# Patient Record
Sex: MALE | Race: WHITE | NOT HISPANIC OR LATINO | URBAN - METROPOLITAN AREA
[De-identification: names, ages, dates, MRNs, and addresses within clinical notes are randomized per-mention and may not be internally consistent; named-entity substitution may affect disease eponyms.]

---

## 2017-11-29 ENCOUNTER — INPATIENT (INPATIENT)
Facility: HOSPITAL | Age: 47
LOS: 4 days | Discharge: HOME | DRG: 872 | End: 2017-12-04
Attending: SPECIALIST | Admitting: SPECIALIST
Payer: COMMERCIAL

## 2017-11-29 VITALS
RESPIRATION RATE: 16 BRPM | SYSTOLIC BLOOD PRESSURE: 130 MMHG | OXYGEN SATURATION: 100 % | HEART RATE: 96 BPM | DIASTOLIC BLOOD PRESSURE: 74 MMHG | WEIGHT: 199.96 LBS | TEMPERATURE: 98 F

## 2017-11-29 DIAGNOSIS — N17.9 ACUTE KIDNEY FAILURE, UNSPECIFIED: ICD-10-CM

## 2017-11-29 DIAGNOSIS — S41.119A LACERATION WITHOUT FOREIGN BODY OF UNSPECIFIED UPPER ARM, INITIAL ENCOUNTER: Chronic | ICD-10-CM

## 2017-11-29 DIAGNOSIS — K65.1 PERITONEAL ABSCESS: ICD-10-CM

## 2017-11-29 DIAGNOSIS — Z98.890 OTHER SPECIFIED POSTPROCEDURAL STATES: Chronic | ICD-10-CM

## 2017-11-29 DIAGNOSIS — I47.1 SUPRAVENTRICULAR TACHYCARDIA: ICD-10-CM

## 2017-11-29 DIAGNOSIS — J98.11 ATELECTASIS: ICD-10-CM

## 2017-11-29 DIAGNOSIS — F19.10 OTHER PSYCHOACTIVE SUBSTANCE ABUSE, UNCOMPLICATED: ICD-10-CM

## 2017-11-29 DIAGNOSIS — K57.30 DIVERTICULOSIS OF LARGE INTESTINE WITHOUT PERFORATION OR ABSCESS WITHOUT BLEEDING: ICD-10-CM

## 2017-11-29 DIAGNOSIS — F17.200 NICOTINE DEPENDENCE, UNSPECIFIED, UNCOMPLICATED: ICD-10-CM

## 2017-11-29 LAB
ALBUMIN SERPL ELPH-MCNC: 2.3 G/DL — LOW (ref 3.3–5)
ALP SERPL-CCNC: 82 U/L — SIGNIFICANT CHANGE UP (ref 40–120)
ALT FLD-CCNC: 99 U/L — HIGH (ref 12–78)
AMYLASE P1 CFR SERPL: 20 U/L — LOW (ref 25–115)
ANION GAP SERPL CALC-SCNC: 6 MMOL/L — SIGNIFICANT CHANGE UP (ref 5–17)
ANION GAP SERPL CALC-SCNC: 6 MMOL/L — SIGNIFICANT CHANGE UP (ref 5–17)
APPEARANCE UR: CLEAR — SIGNIFICANT CHANGE UP
AST SERPL-CCNC: 48 U/L — HIGH (ref 15–37)
BASOPHILS # BLD AUTO: 0.2 K/UL — SIGNIFICANT CHANGE UP (ref 0–0.2)
BASOPHILS NFR BLD AUTO: 0.9 % — SIGNIFICANT CHANGE UP (ref 0–2)
BILIRUB SERPL-MCNC: 0.2 MG/DL — SIGNIFICANT CHANGE UP (ref 0.2–1.2)
BILIRUB UR-MCNC: NEGATIVE — SIGNIFICANT CHANGE UP
BUN SERPL-MCNC: 10 MG/DL — SIGNIFICANT CHANGE UP (ref 7–23)
BUN SERPL-MCNC: 8 MG/DL — SIGNIFICANT CHANGE UP (ref 7–23)
CALCIUM SERPL-MCNC: 7.3 MG/DL — LOW (ref 8.5–10.1)
CALCIUM SERPL-MCNC: 8.6 MG/DL — SIGNIFICANT CHANGE UP (ref 8.5–10.1)
CHLORIDE SERPL-SCNC: 104 MMOL/L — SIGNIFICANT CHANGE UP (ref 96–108)
CHLORIDE SERPL-SCNC: 107 MMOL/L — SIGNIFICANT CHANGE UP (ref 96–108)
CO2 SERPL-SCNC: 28 MMOL/L — SIGNIFICANT CHANGE UP (ref 22–31)
CO2 SERPL-SCNC: 31 MMOL/L — SIGNIFICANT CHANGE UP (ref 22–31)
COLOR SPEC: YELLOW — SIGNIFICANT CHANGE UP
CREAT SERPL-MCNC: 1.2 MG/DL — SIGNIFICANT CHANGE UP (ref 0.5–1.3)
CREAT SERPL-MCNC: 1.9 MG/DL — HIGH (ref 0.5–1.3)
DIFF PNL FLD: ABNORMAL
EOSINOPHIL # BLD AUTO: 0.2 K/UL — SIGNIFICANT CHANGE UP (ref 0–0.5)
EOSINOPHIL NFR BLD AUTO: 1.2 % — SIGNIFICANT CHANGE UP (ref 0–6)
GLUCOSE SERPL-MCNC: 84 MG/DL — SIGNIFICANT CHANGE UP (ref 70–99)
GLUCOSE SERPL-MCNC: 91 MG/DL — SIGNIFICANT CHANGE UP (ref 70–99)
GLUCOSE UR QL: NEGATIVE — SIGNIFICANT CHANGE UP
GRAM STN FLD: SIGNIFICANT CHANGE UP
HCT VFR BLD CALC: 35.1 % — LOW (ref 39–50)
HCT VFR BLD CALC: 40 % — SIGNIFICANT CHANGE UP (ref 39–50)
HGB BLD-MCNC: 11.2 G/DL — LOW (ref 13–17)
HGB BLD-MCNC: 13 G/DL — SIGNIFICANT CHANGE UP (ref 13–17)
KETONES UR-MCNC: NEGATIVE — SIGNIFICANT CHANGE UP
LACTATE SERPL-SCNC: 1.1 MMOL/L — SIGNIFICANT CHANGE UP (ref 0.7–2)
LACTATE SERPL-SCNC: 2.4 MMOL/L — HIGH (ref 0.7–2)
LEUKOCYTE ESTERASE UR-ACNC: NEGATIVE — SIGNIFICANT CHANGE UP
LIDOCAIN IGE QN: 104 U/L — SIGNIFICANT CHANGE UP (ref 73–393)
LYMPHOCYTES # BLD AUTO: 1.6 K/UL — SIGNIFICANT CHANGE UP (ref 1–3.3)
LYMPHOCYTES # BLD AUTO: 9.5 % — LOW (ref 13–44)
MCHC RBC-ENTMCNC: 28.4 PG — SIGNIFICANT CHANGE UP (ref 27–34)
MCHC RBC-ENTMCNC: 29.1 PG — SIGNIFICANT CHANGE UP (ref 27–34)
MCHC RBC-ENTMCNC: 31.9 GM/DL — LOW (ref 32–36)
MCHC RBC-ENTMCNC: 32.4 GM/DL — SIGNIFICANT CHANGE UP (ref 32–36)
MCV RBC AUTO: 89 FL — SIGNIFICANT CHANGE UP (ref 80–100)
MCV RBC AUTO: 89.7 FL — SIGNIFICANT CHANGE UP (ref 80–100)
MONOCYTES # BLD AUTO: 0.9 K/UL — SIGNIFICANT CHANGE UP (ref 0–0.9)
MONOCYTES NFR BLD AUTO: 5.5 % — SIGNIFICANT CHANGE UP (ref 1–9)
NEUTROPHILS # BLD AUTO: 13.9 K/UL — HIGH (ref 1.8–7.4)
NEUTROPHILS NFR BLD AUTO: 82.8 % — HIGH (ref 43–77)
NITRITE UR-MCNC: NEGATIVE — SIGNIFICANT CHANGE UP
PH UR: 5 — SIGNIFICANT CHANGE UP (ref 5–8)
PLATELET # BLD AUTO: 589 K/UL — HIGH (ref 150–400)
PLATELET # BLD AUTO: 830 K/UL — HIGH (ref 150–400)
POTASSIUM SERPL-MCNC: 4 MMOL/L — SIGNIFICANT CHANGE UP (ref 3.5–5.3)
POTASSIUM SERPL-MCNC: 4.5 MMOL/L — SIGNIFICANT CHANGE UP (ref 3.5–5.3)
POTASSIUM SERPL-SCNC: 4 MMOL/L — SIGNIFICANT CHANGE UP (ref 3.5–5.3)
POTASSIUM SERPL-SCNC: 4.5 MMOL/L — SIGNIFICANT CHANGE UP (ref 3.5–5.3)
PROT SERPL-MCNC: 7.6 G/DL — SIGNIFICANT CHANGE UP (ref 6–8.3)
PROT UR-MCNC: NEGATIVE — SIGNIFICANT CHANGE UP
RBC # BLD: 3.95 M/UL — LOW (ref 4.2–5.8)
RBC # BLD: 4.45 M/UL — SIGNIFICANT CHANGE UP (ref 4.2–5.8)
RBC # FLD: 12.9 % — SIGNIFICANT CHANGE UP (ref 10.3–14.5)
RBC # FLD: 12.9 % — SIGNIFICANT CHANGE UP (ref 10.3–14.5)
SODIUM SERPL-SCNC: 141 MMOL/L — SIGNIFICANT CHANGE UP (ref 135–145)
SODIUM SERPL-SCNC: 141 MMOL/L — SIGNIFICANT CHANGE UP (ref 135–145)
SP GR SPEC: 1.01 — SIGNIFICANT CHANGE UP (ref 1.01–1.02)
SPECIMEN SOURCE: SIGNIFICANT CHANGE UP
UROBILINOGEN FLD QL: NEGATIVE — SIGNIFICANT CHANGE UP
WBC # BLD: 16.8 K/UL — HIGH (ref 3.8–10.5)
WBC # BLD: 17.5 K/UL — HIGH (ref 3.8–10.5)
WBC # FLD AUTO: 16.8 K/UL — HIGH (ref 3.8–10.5)
WBC # FLD AUTO: 17.5 K/UL — HIGH (ref 3.8–10.5)

## 2017-11-29 PROCEDURE — 99285 EMERGENCY DEPT VISIT HI MDM: CPT

## 2017-11-29 PROCEDURE — 99223 1ST HOSP IP/OBS HIGH 75: CPT

## 2017-11-29 PROCEDURE — 10160 PNXR ASPIR ABSC HMTMA BULLA: CPT

## 2017-11-29 PROCEDURE — 74176 CT ABD & PELVIS W/O CONTRAST: CPT | Mod: 26

## 2017-11-29 PROCEDURE — 77012 CT SCAN FOR NEEDLE BIOPSY: CPT | Mod: 26

## 2017-11-29 PROCEDURE — 99221 1ST HOSP IP/OBS SF/LOW 40: CPT

## 2017-11-29 RX ORDER — SODIUM CHLORIDE 9 MG/ML
1000 INJECTION, SOLUTION INTRAVENOUS
Qty: 0 | Refills: 0 | Status: DISCONTINUED | OUTPATIENT
Start: 2017-11-29 | End: 2017-12-02

## 2017-11-29 RX ORDER — SODIUM CHLORIDE 9 MG/ML
3 INJECTION INTRAMUSCULAR; INTRAVENOUS; SUBCUTANEOUS ONCE
Qty: 0 | Refills: 0 | Status: COMPLETED | OUTPATIENT
Start: 2017-11-29 | End: 2017-11-29

## 2017-11-29 RX ORDER — PIPERACILLIN AND TAZOBACTAM 4; .5 G/20ML; G/20ML
3.38 INJECTION, POWDER, LYOPHILIZED, FOR SOLUTION INTRAVENOUS EVERY 8 HOURS
Qty: 0 | Refills: 0 | Status: DISCONTINUED | OUTPATIENT
Start: 2017-11-29 | End: 2017-12-04

## 2017-11-29 RX ORDER — ONDANSETRON 8 MG/1
4 TABLET, FILM COATED ORAL EVERY 8 HOURS
Qty: 0 | Refills: 0 | Status: DISCONTINUED | OUTPATIENT
Start: 2017-11-29 | End: 2017-12-04

## 2017-11-29 RX ORDER — IOHEXOL 300 MG/ML
30 INJECTION, SOLUTION INTRAVENOUS ONCE
Qty: 0 | Refills: 0 | Status: COMPLETED | OUTPATIENT
Start: 2017-11-29 | End: 2017-11-29

## 2017-11-29 RX ORDER — MORPHINE SULFATE 50 MG/1
4 CAPSULE, EXTENDED RELEASE ORAL ONCE
Qty: 0 | Refills: 0 | Status: DISCONTINUED | OUTPATIENT
Start: 2017-11-29 | End: 2017-11-29

## 2017-11-29 RX ORDER — NICOTINE POLACRILEX 2 MG
1 GUM BUCCAL DAILY
Qty: 0 | Refills: 0 | Status: DISCONTINUED | OUTPATIENT
Start: 2017-11-29 | End: 2017-12-04

## 2017-11-29 RX ORDER — MORPHINE SULFATE 50 MG/1
2 CAPSULE, EXTENDED RELEASE ORAL EVERY 4 HOURS
Qty: 0 | Refills: 0 | Status: DISCONTINUED | OUTPATIENT
Start: 2017-11-29 | End: 2017-12-04

## 2017-11-29 RX ORDER — SODIUM CHLORIDE 9 MG/ML
1000 INJECTION INTRAMUSCULAR; INTRAVENOUS; SUBCUTANEOUS ONCE
Qty: 0 | Refills: 0 | Status: COMPLETED | OUTPATIENT
Start: 2017-11-29 | End: 2017-11-29

## 2017-11-29 RX ORDER — PIPERACILLIN AND TAZOBACTAM 4; .5 G/20ML; G/20ML
3.38 INJECTION, POWDER, LYOPHILIZED, FOR SOLUTION INTRAVENOUS ONCE
Qty: 0 | Refills: 0 | Status: COMPLETED | OUTPATIENT
Start: 2017-11-29 | End: 2017-11-29

## 2017-11-29 RX ORDER — ONDANSETRON 8 MG/1
4 TABLET, FILM COATED ORAL ONCE
Qty: 0 | Refills: 0 | Status: COMPLETED | OUTPATIENT
Start: 2017-11-29 | End: 2017-11-29

## 2017-11-29 RX ORDER — ENOXAPARIN SODIUM 100 MG/ML
40 INJECTION SUBCUTANEOUS EVERY 24 HOURS
Qty: 0 | Refills: 0 | Status: DISCONTINUED | OUTPATIENT
Start: 2017-11-29 | End: 2017-11-29

## 2017-11-29 RX ORDER — MORPHINE SULFATE 50 MG/1
4 CAPSULE, EXTENDED RELEASE ORAL EVERY 6 HOURS
Qty: 0 | Refills: 0 | Status: DISCONTINUED | OUTPATIENT
Start: 2017-11-29 | End: 2017-12-04

## 2017-11-29 RX ORDER — ACETAMINOPHEN 500 MG
650 TABLET ORAL EVERY 6 HOURS
Qty: 0 | Refills: 0 | Status: DISCONTINUED | OUTPATIENT
Start: 2017-11-29 | End: 2017-12-04

## 2017-11-29 RX ORDER — PANTOPRAZOLE SODIUM 20 MG/1
40 TABLET, DELAYED RELEASE ORAL DAILY
Qty: 0 | Refills: 0 | Status: DISCONTINUED | OUTPATIENT
Start: 2017-11-29 | End: 2017-12-04

## 2017-11-29 RX ORDER — PANTOPRAZOLE SODIUM 20 MG/1
40 TABLET, DELAYED RELEASE ORAL ONCE
Qty: 0 | Refills: 0 | Status: COMPLETED | OUTPATIENT
Start: 2017-11-29 | End: 2017-11-29

## 2017-11-29 RX ADMIN — MORPHINE SULFATE 4 MILLIGRAM(S): 50 CAPSULE, EXTENDED RELEASE ORAL at 22:09

## 2017-11-29 RX ADMIN — MORPHINE SULFATE 4 MILLIGRAM(S): 50 CAPSULE, EXTENDED RELEASE ORAL at 12:17

## 2017-11-29 RX ADMIN — SODIUM CHLORIDE 1000 MILLILITER(S): 9 INJECTION INTRAMUSCULAR; INTRAVENOUS; SUBCUTANEOUS at 12:30

## 2017-11-29 RX ADMIN — MORPHINE SULFATE 4 MILLIGRAM(S): 50 CAPSULE, EXTENDED RELEASE ORAL at 15:05

## 2017-11-29 RX ADMIN — PANTOPRAZOLE SODIUM 40 MILLIGRAM(S): 20 TABLET, DELAYED RELEASE ORAL at 11:44

## 2017-11-29 RX ADMIN — MORPHINE SULFATE 4 MILLIGRAM(S): 50 CAPSULE, EXTENDED RELEASE ORAL at 11:51

## 2017-11-29 RX ADMIN — MORPHINE SULFATE 4 MILLIGRAM(S): 50 CAPSULE, EXTENDED RELEASE ORAL at 11:00

## 2017-11-29 RX ADMIN — MORPHINE SULFATE 4 MILLIGRAM(S): 50 CAPSULE, EXTENDED RELEASE ORAL at 21:39

## 2017-11-29 RX ADMIN — PIPERACILLIN AND TAZOBACTAM 200 GRAM(S): 4; .5 INJECTION, POWDER, LYOPHILIZED, FOR SOLUTION INTRAVENOUS at 12:18

## 2017-11-29 RX ADMIN — SODIUM CHLORIDE 1000 MILLILITER(S): 9 INJECTION INTRAMUSCULAR; INTRAVENOUS; SUBCUTANEOUS at 11:24

## 2017-11-29 RX ADMIN — MORPHINE SULFATE 4 MILLIGRAM(S): 50 CAPSULE, EXTENDED RELEASE ORAL at 14:32

## 2017-11-29 RX ADMIN — ONDANSETRON 4 MILLIGRAM(S): 8 TABLET, FILM COATED ORAL at 11:25

## 2017-11-29 RX ADMIN — SODIUM CHLORIDE 3 MILLILITER(S): 9 INJECTION INTRAMUSCULAR; INTRAVENOUS; SUBCUTANEOUS at 11:24

## 2017-11-29 RX ADMIN — SODIUM CHLORIDE 1000 MILLILITER(S): 9 INJECTION INTRAMUSCULAR; INTRAVENOUS; SUBCUTANEOUS at 12:39

## 2017-11-29 RX ADMIN — SODIUM CHLORIDE 125 MILLILITER(S): 9 INJECTION, SOLUTION INTRAVENOUS at 18:52

## 2017-11-29 RX ADMIN — PIPERACILLIN AND TAZOBACTAM 25 GRAM(S): 4; .5 INJECTION, POWDER, LYOPHILIZED, FOR SOLUTION INTRAVENOUS at 22:30

## 2017-11-29 RX ADMIN — IOHEXOL 30 MILLILITER(S): 300 INJECTION, SOLUTION INTRAVENOUS at 11:00

## 2017-11-29 NOTE — PROGRESS NOTE ADULT - ASSESSMENT
46y Male hx substance abuse admitted w/ sepsis, intrabdominal abcess, s/p IR drainage.  Pt hemodynamically stable and non-toxic appearance at this time. No indication for critical care monitoring.  Stable for Tele. Cont IVF hydration and abx.  Will signout to evening PA incase patient decompensates on the floor and will re-eval.    Case discussed w/ Dr Chino and agrees with plan.    30min CC

## 2017-11-29 NOTE — H&P ADULT - ASSESSMENT
46 Y.O. M with abdominal pain- CT scan (+) Large thick walled collection in the lower abdomen in the midline suggestive of abscess with air-fluid level-   1) Pt to undergo IR guided aspiration of abscess with Dr. Mas and fluid to be sent for culture and sensitivity.  2) IV Zosyn  3) Admit to Dr. Tan  4) Close monitoring for sepsis.  5) IVF

## 2017-11-29 NOTE — ED PROVIDER NOTE - OBJECTIVE STATEMENT
Pt is a 47 yo male who presents to the ED with a cc of abdominal pain.  Denies significant past medical history.  Pt reports that he had one episode of colitis approx 20 years ago which required no surgical intervention.  He did have a colonoscopy around that time and he reports no significant abnormality. Pt is a 45 yo male who presents to the ED with a cc of abdominal pain.  Denies significant past medical history.  Pt reports that he had one episode of colitis approx 20 years ago which required no surgical intervention.  He did have a colonoscopy around that time and he reports no significant abnormality.  He reports that the abdominal pain first began approx 1 month ago.  It is diffuse across his lower abd but worse on in his RLQ.  He states that the pain has improved somewhat but is still severe and worsened with positional changes.  He followed up with his PMD for the first time today in regards to this abdominal pain and was sent to the ED for further work up.  He reports low grades fevers for the last 2 days with T max of 100.  Pt also reports associated chills, intermittent nausea, and green watery stool.  He did not one episode of blood in his stool yesterday but this resolved and did not occur today.  Denies V/C, CP, SOB, ext numbness/weakness.  He states that his appetite has been unchanged.  He has not tried anything for the symptoms

## 2017-11-29 NOTE — H&P ADULT - NSHPSOCIALHISTORY_GEN_ALL_CORE
(+) Smoker 1.5 ppd x 30 years  (+) Cocaine use- "once a month" socially per patient  denies any other drug use.  Denies Etoh use

## 2017-11-29 NOTE — PROGRESS NOTE ADULT - SUBJECTIVE AND OBJECTIVE BOX
Critical Care PA     46y Male hx substance abuse presents to ED w/ abd painX1 month.  Pt went to his PMD today w/ abd pain and diarrhea.  In ED, a large abd abcess was found in CT and subsequently he underwent IR drainage w/ drain in place. Pt admits to tactile fevers and was taking extra strength Tylenol for the pain.  Denies N/V, trauma, melena, or change in bowel habits.    --- PMHx.---    No pertinent past medical history         Review Of Systems: All reviewed systems were negative.    Previous Medical Record reviewed and case has been discussed with EICU.    --- Medications ---    acetaminophen   Tablet 650 milliGRAM(s) PRN  enoxaparin Injectable 40 milliGRAM(s)  lactated ringers. 1000 milliLiter(s)  morphine  - Injectable 2 milliGRAM(s) PRN  morphine  - Injectable 4 milliGRAM(s) PRN  nicotine - 21 mG/24Hr(s) Patch 1 patch PRN  ondansetron Injectable 4 milliGRAM(s) PRN  piperacillin/tazobactam IVPB. 3.375 Gram(s)        T(C): 37.1 (17 @ 18:08), Max: 37.4 (17 @ 17:30)  HR: 83 (17 @ 18:08)  BP: 121/70 (17 @ 18:08)  RR: 16 (17 @ 18:08)  SpO2: 96% (17 @ 18:08)  Wt(kg): --    --- Physical Exam ---    General:   Alert, oriented, non-toxic appearing     HEENT: Pupils equal, reactive to light.  Symmetric.    PULM: Clear to auscultation bilaterally    CVS: Regular rate and rhythm, no murmurs, rubs, or gallops    ABD: Soft, +generalized tendernessX4 quads, no rebound +BS +drain w/ milky brown drainage     EXT: No edema, nontender    SKIN: Warm and well perfused    --- Labratories ---                           13.0   16.8  )-----------( 589      ( 2017 11:24 )             40.0        141  |  104  |  10  ----------------------------<  84  4.5   |  31  |  1.90<H>    Ca    8.6      2017 11:24    TPro  7.6  /  Alb  2.3<L>  /  TBili  0.2  /  DBili  x   /  AST  48<H>  /  ALT  99<H>  /  AlkPhos  82    Urinalysis Basic - ( 2017 12:56 )    Color: Yellow / Appearance: Clear / S.015 / pH: x  Gluc: x / Ketone: Negative  / Bili: Negative / Urobili: Negative   Blood: x / Protein: Negative / Nitrite: Negative   Leuk Esterase: Negative / RBC: 3-5 /HPF / WBC 3-5   Sq Epi: x / Non Sq Epi: Occasional / Bacteria: Negative            Radiology  / Ultrasound :     (Reviewing data, imaging, discussing with multidisciplinary team, non inclusive of procedures, discussing goals of care with patient/family)

## 2017-11-29 NOTE — PROVIDER CONTACT NOTE (CRITICAL VALUE NOTIFICATION) - TEST AND RESULT REPORTED:
Body fluid : numerous polymorphonuclear leukocytes per low power field;numerous gram positive cocci in pairs and chains; numerous gram negative rods.

## 2017-11-29 NOTE — ED PROVIDER NOTE - CARE PLAN
Principal Discharge DX:	Abdominal abscess  Instructions for follow-up, activity and diet:	admit  Secondary Diagnosis:	Abdominal pain  Secondary Diagnosis:	Leukocytosis

## 2017-11-29 NOTE — ED ADULT NURSE REASSESSMENT NOTE - NS ED NURSE REASSESS COMMENT FT1
Called [facility] spoke with Lauryn CHILDS was advised room 361 is dirty and needs to be cleaned and unfortunately they cannot take the report until the room gets cleaned charge nurse Sergey galvin.
patient requesting more pain medication for RLQ  abdomen, 5/10, Adina BRAY made aware medication given as per ordered.
Called [facility] spoke with Archana was advised to call back in 15 mins as nurses are giving report to each other at this time.
Isabel Mcdonald from IR called to advise that patient is coming back to ED with vitals as follows /59; pulse 85; O2 sat 96%; was given Fanetanyl 25 mg and versed 1 mg during sedation.
Patient lying comfortably in bed waiting for CT scan result.
Patient received from IR accompanied by Isabel ROTH attached to cardiac monitor. alert and oriented x 4  received with catheter tube attached to leg bag with output of 50 ml.
Report given to Ashely ROTH contacted admitting team to modify orders for cardiac monitoring; neuro check hourly waiting for feedback.

## 2017-11-29 NOTE — H&P ADULT - NSHPLABSRESULTS_GEN_ALL_CORE
13.0   16.8  )-----------( 589      ( 29 Nov 2017 11:24 )             40.0   11-29    141  |  104  |  10  ----------------------------<  84  4.5   |  31  |  1.90<H>    Ca    8.6      29 Nov 2017 11:24    TPro  7.6  /  Alb  2.3<L>  /  TBili  0.2  /  DBili  x   /  AST  48<H>  /  ALT  99<H>  /  AlkPhos  82  11-29

## 2017-11-29 NOTE — ED PROVIDER NOTE - CONDUCTED A DETAILED DISCUSSION WITH PATIENT AND/OR GUARDIAN REGARDING, MDM
radiology results/need to admit/lab results/return to ED if symptoms worsen, persist or questions arise

## 2017-11-29 NOTE — ED ADULT NURSE REASSESSMENT NOTE - TEMPLATE LIST FOR HEAD TO TOE ASSESSMENT
Abdominal Pain, N/V/D

## 2017-11-29 NOTE — ED ADULT NURSE NOTE - ED STAT RN HANDOFF DETAILS
Hand off report given to Vipul RN , no preop checklist as advised. Hand off report given to Galina ROTH.

## 2017-11-29 NOTE — CONSULT NOTE ADULT - SUBJECTIVE AND OBJECTIVE BOX
Date/Time Patient Seen:  		  Referring MD:   Data Reviewed	       Patient is a 46y old  Male who presents with a chief complaint of "I am having abdominal pain and diarrhea x past 1 month" (29 Nov 2017 16:17)      Subjective/HPI  seen and examined  vs and meds reviewed  abd pain, s/p IR drain of intestinal abscess - 120 cc drained, purulent material, during the procedure pt had SVT run  pt smoker, occ drinker, uses recreational drugs, cocaine, marijuana,  pt is single, works in construction    46 Y.O. M presents to [place] ED with 1 month history of worsening abdominal pain. Pt reports pain started spontaneously and is globally about the abdomen and somewhat worse in the right lower quadrant region. Pt reports mild nausea and intermittent greenish diarrhea x past 1 month. Pt denies any history of abdominal surgery in the past but does report episode of colitis ~20 years ago treated with IV antibiotics only and no surgical intervention. Pt reports having a colonoscopy at the time but does not recall having any recent colonoscopy or upper endoscopy. Pt reports having lower grade fevers  and chills for the past few days. Pt reports 1 episode of blood in his stool yesterday with no recurrence. Pt reports taking xanax and tylenol as needed for pain. Pt reports the symptoms have remained the same but patient decided to see PMD- Dr. Gates and Dr. Alcala today and was sent to ED for further management. Pt underwent CT Abd/Pelvis upon arrival to ED which revealed- Large thick walled collection in the lower abdomen in the midline suggestive of abscess with air-fluid level. A loop of small bowel is draped around this collection however the origin may be from adjacent diverticulitis in the proximal sigmoid colon. Nonobstructive right renal calculus. Thickened and nodular adrenal glands suggests hyperplasia. Mild thickening of the distal esophagus.     PAST MEDICAL & SURGICAL HISTORY:  No pertinent past medical history  Arm laceration: repair of arm laceration 1995- tendon repair  S/P shoulder surgery: left        Medication list         MEDICATIONS  (STANDING):  enoxaparin Injectable 40 milliGRAM(s) SubCutaneous every 24 hours  lactated ringers. 1000 milliLiter(s) (125 mL/Hr) IV Continuous <Continuous>  piperacillin/tazobactam IVPB. 3.375 Gram(s) IV Intermittent every 8 hours    MEDICATIONS  (PRN):  acetaminophen   Tablet 650 milliGRAM(s) Oral every 6 hours PRN For Temp greater than 38 C (100.4 F)  morphine  - Injectable 2 milliGRAM(s) IV Push every 4 hours PRN Moderate Pain (4 - 6)  morphine  - Injectable 4 milliGRAM(s) IV Push every 6 hours PRN Severe Pain (7 - 10)  ondansetron Injectable 4 milliGRAM(s) IV Push every 8 hours PRN Nausea and/or Vomiting         Vitals log        ICU Vital Signs Last 24 Hrs  T(C): 37.3 (29 Nov 2017 16:15), Max: 37.3 (29 Nov 2017 16:15)  T(F): 99.2 (29 Nov 2017 16:15), Max: 99.2 (29 Nov 2017 16:15)  HR: 94 (29 Nov 2017 16:15) (94 - 99)  BP: 112/55 (29 Nov 2017 16:15) (112/55 - 130/74)  BP(mean): --  ABP: --  ABP(mean): --  RR: 14 (29 Nov 2017 16:15) (14 - 16)  SpO2: 96% (29 Nov 2017 16:15) (96% - 100%)           Input and Output:  I&O's Detail      Lab Data                        13.0   16.8  )-----------( 589      ( 29 Nov 2017 11:24 )             40.0     11-29    141  |  104  |  10  ----------------------------<  84  4.5   |  31  |  1.90<H>    Ca    8.6      29 Nov 2017 11:24    TPro  7.6  /  Alb  2.3<L>  /  TBili  0.2  /  DBili  x   /  AST  48<H>  /  ALT  99<H>  /  AlkPhos  82  11-29      smoker  occ drinker  recr drug use  single  construction - demolition business        Review of Systems	  abd pain    Objective     Physical Examination  head at  heart - s1s2  lungs - dec BS  cn grossly int  IR drain       Pertinent Lab findings & Imaging      Bermeo:  NO   Adequate UO     I&O's Detail           Discussed with:     Cultures:	        Radiology        EXAM:  CT ABDOMEN AND PELVIS OC                            PROCEDURE DATE:  11/29/2017          INTERPRETATION:  CLINICAL STATEMENT: diffuse lower abd pain    TECHNIQUE: CT of the abdomen and pelvis was performed without IV   contrast.  Oral contrast was administered. This scan was performed using   automatic exposure control (radiation dose reduction software) to obtain   a diagnostic image quality scan with patient dose as low as reasonably   achievable.       COMPARISON: None.    FINDINGS:    The lower chest is remarkable for mild thickening of the distal esophagus.    The evaluation of the abdominal viscera is limited without IV contrast.    The liver, spleen, and pancreas are grossly unremarkable. The adrenal   glands are thickened and nodular bilaterally suggesting hyperplasia The   partly contracted gallbladder is seen.    The kidneys are remarkable for punctate calcification in the right lower   pole. The fluid-filled bladder is unremarkable.    There is no bowel obstruction.  Probable duodenal diverticulum is noted.   There is significant diverticulosis of the sigmoid colon. There is a   fat-containing left inguinal hernia. There is a thick walled cystic mass   with an air-fluid level in the lower abdomen in the midline inseparable   from adjacent small bowel loop. This measures 10.7 x 7.0 x 6.9 cm.   Inflammatory changes are noted in the fat around the proximal sigmoid   colon. While this abscess is not directly contiguous with the sigmoid   colon, statistically this may be a diverticular abscess especially with   long-standing history of pain.    There is no intraperitoneal free air.  There is no free fluid. there is   no significant retroperitoneal or pelvic lymphadenopathy. The aorta is   not aneurysmal.    There is no abdominal or pelvic lymphadenopathy.  The pelvic structures   are grossly unremarkable.    The osseous structures demonstrate degenerative changes of the spine and   right hip. There is significant L5-S1 disc space narrowing..    IMPRESSION: Large thick walled collection in the lower abdomen in the   midline suggestive of abscess with air-fluid level. A loop of small bowel   is draped around this collection however the origin may be from adjacent   diverticulitis in the proximal sigmoid colon.  Nonobstructive right renal calculus  Thickened and nodular adrenal glands suggests hyperplasia.   Mild thickening of the distal esophagus  Results were discussed with Dr. Chao at 3:10 PM on November 29, 2017                MIN HEAD M.D.,ATTENDING RADIOLOGIST  This document has been electronically signed. Nov 29 2017  3:11PM

## 2017-11-29 NOTE — H&P ADULT - HISTORY OF PRESENT ILLNESS
46 Y.O. M presents to [facility] ED with 1 month history of worsening abdominal pain. Pt reports pain started spontaneously and is globally about the abdomen and somewhat worse in the right lower quadrant region. Pt reports mild nausea and intermittent greenish diarrhea x past 1 month. Pt denies any history of abdominal surgery in the past but does report episode of colitis ~20 years ago treated with IV antibiotics only and no surgical intervention. Pt reports having a colonoscopy at the time but does not recall having any recent colonoscopy or upper endoscopy. Pt reports having lower grade fevers  and chills for the past few days. Pt reports 1 pisode of blood in his stool yesterday with no recurrence. Pt reports taking xanax and tylenol as needed for pain. Pt reports the symptoms have remained the same but patient decided to see PMD- Dr. Gates and Dr. Alcala today and was sent to ED for further management. Pt underwent CT Abd/Pelvis upon arrival to ED which revealed- Large thick walled collection in the lower abdomen in the midline suggestive of abscess with air-fluid level. A loop of small bowel is draped around this collection however the origin may be from adjacent diverticulitis in the proximal sigmoid colon. Nonobstructive right renal calculus. Thickened and nodular adrenal glands suggests hyperplasia. Mild thickening of the distal esophagus.    Denies any recent CP, SOB, dysuria.

## 2017-11-29 NOTE — H&P ADULT - ATTENDING COMMENTS
I have reviewed pts history, Discussed CT findings with attending radiologist Dr. Vaughn who thinks abscess originate from diverticulitis. I also discussed the case with IR Dr. Rocha who eventually drained the abscess. Pt may require ICU admission if becomes septic after drainage and this was discussed with Dr Chino ICU attending. Pt currently on antibiotics, eventually will need surgical intervention

## 2017-11-29 NOTE — ED ADULT NURSE NOTE - OBJECTIVE STATEMENT
Patient states he has right lower abdominal pain for a month now but he started to have fever last night seen and evaluated by Dr. Chao with orders made and carried out. For CT scan oral contrast was started care provided before notes.

## 2017-11-29 NOTE — H&P ADULT - NSHPPHYSICALEXAM_GEN_ALL_CORE
PHYSICAL EXAM:  GENERAL: Mild distress, well-developed  HEAD:  Atraumatic, Normocephalic  HEART: Regular rate and rhythm; No murmurs, rubs, or gallops  RESPIRATORY: CTA B/L, No W/R/R  ABDOMEN: Soft, (+) TTP to right lower quadrant, epigastric and left lower quadrant region. Bowel sounds present  NEUROLOGY: A&Ox3, nonfocal

## 2017-11-29 NOTE — CONSULT NOTE ADULT - SUBJECTIVE AND OBJECTIVE BOX
[facility] - Joyce Wade, Alice, Michaela, Aaron, Frederick Chino  Office Number: [phone]    Initial Consult Note    CHIEF COMPLAINT: Patient is a 46y old  Male who presents with a chief complaint of "I am having abdominal pain and diarrhea x past 1 month" (29 Nov 2017 16:17)      HPI:  46 Y.O. M presents to [place] ED with 1 month history of worsening abdominal pain. Pt reports pain started spontaneously and is globally about the abdomen and somewhat worse in the right lower quadrant region. Pt reports mild nausea and intermittent greenish diarrhea x past 1 month. Pt denies any history of abdominal surgery in the past but does report episode of colitis ~20 years ago treated with IV antibiotics only and no surgical intervention. Pt reports having a colonoscopy at the time but does not recall having any recent colonoscopy or upper endoscopy. Pt reports having lower grade fevers  and chills for the past few days. Pt reports 1 pisode of blood in his stool yesterday with no recurrence. Pt reports taking xanax and tylenol as needed for pain. Pt reports the symptoms have remained the same but patient decided to see PMD- Dr. Gates and Dr. Alcala today and was sent to ED for further management. Pt underwent CT Abd/Pelvis upon arrival to ED which revealed- Large thick walled collection in the lower abdomen in the midline suggestive of abscess with air-fluid level. A loop of small bowel is draped around this collection however the origin may be from adjacent diverticulitis in the proximal sigmoid colon. Nonobstructive right renal calculus. Thickened and nodular adrenal glands suggests hyperplasia. Mild thickening of the distal esophagus.    Called to evaluate for SVT. He denies any history of cardiac issues and has not seen a cardiologist in the past.  He is now s/p IR drain of intestinal abscess - 120 cc drained, purulent material. He had a run of SVT >160 bpm after the procedure.  He is now in ER, in abdominal pain. He denies palpitations, chest pain and difficulty breathing.  pt smoker, occ drinker, uses recreational drugs, cocaine, marijuana,      PAST MEDICAL & SURGICAL HISTORY:  No pertinent past medical history  Arm laceration: repair of arm laceration 1995- tendon repair  S/P shoulder surgery: left      SOCIAL HISTORY:  As above. + smoker,+ drug use    FAMILY HISTORY:  No pertinent family history in first degree relatives    No family history of acute MI or sudden cardiac death.    MEDICATIONS  (STANDING):  lactated ringers. 1000 milliLiter(s) (125 mL/Hr) IV Continuous <Continuous>  pantoprazole  Injectable 40 milliGRAM(s) IV Push daily  piperacillin/tazobactam IVPB. 3.375 Gram(s) IV Intermittent every 8 hours    MEDICATIONS  (PRN):  acetaminophen   Tablet 650 milliGRAM(s) Oral every 6 hours PRN For Temp greater than 38 C (100.4 F)  morphine  - Injectable 2 milliGRAM(s) IV Push every 4 hours PRN Moderate Pain (4 - 6)  morphine  - Injectable 4 milliGRAM(s) IV Push every 6 hours PRN Severe Pain (7 - 10)  nicotine - 21 mG/24Hr(s) Patch 1 patch Transdermal daily PRN nicotine w/d sx  ondansetron Injectable 4 milliGRAM(s) IV Push every 8 hours PRN Nausea and/or Vomiting      Allergies    No Known Allergies    Intolerances        REVIEW OF SYSTEMS:    CONSTITUTIONAL: + weakness, fevers or chills  EYES/ENT: No visual changes;  No vertigo or throat pain   NECK: No pain or stiffness  RESPIRATORY: No cough, wheezing, hemoptysis; No shortness of breath  CARDIOVASCULAR: No chest pain or palpitations  GASTROINTESTINAL: + abdominal pain. + nausea, + vomiting, or hematemesis; No diarrhea or constipation. No melena or hematochezia.  GENITOURINARY: No dysuria, frequency or hematuria  NEUROLOGICAL: No numbness or weakness  SKIN: No itching or rash  All other review of systems is negative unless indicated above    VITAL SIGNS:   Vital Signs Last 24 Hrs  T(C): 36.8 (29 Nov 2017 19:20), Max: 37.4 (29 Nov 2017 17:30)  T(F): 98.3 (29 Nov 2017 19:20), Max: 99.4 (29 Nov 2017 17:30)  HR: 75 (29 Nov 2017 19:20) (75 - 99)  BP: 112/65 (29 Nov 2017 19:20) (112/55 - 130/74)  BP(mean): --  RR: 16 (29 Nov 2017 19:20) (14 - 16)  SpO2: 96% (29 Nov 2017 19:20) (95% - 100%)    I&O's Summary    29 Nov 2017 07:01  -  29 Nov 2017 20:02  --------------------------------------------------------  IN: 0 mL / OUT: 120 mL / NET: -120 mL        On Exam:    Constitutional: NAD, alert and oriented x 3, in pain  Lungs:  Non-labored, breath sounds are clear bilaterally, No wheezing, rales or rhonchi  Cardiovascular: RRR.  S1 and S2 positive.  No murmurs, rubs, gallops or clicks  Gastrointestinal: bowel distended, decreased BS  Lymph: No peripheral edema. No cervical lymphadenopathy.  Neurological: Alert, no focal deficits  Skin: No rashes or ulcers   Psych:  Mood & affect appropriate.    LABS: All Labs Reviewed:                        13.0   16.8  )-----------( 589      ( 29 Nov 2017 11:24 )             40.0     29 Nov 2017 11:24    141    |  104    |  10     ----------------------------<  84     4.5     |  31     |  1.90     Ca    8.6        29 Nov 2017 11:24    TPro  7.6    /  Alb  2.3    /  TBili  0.2    /  DBili  x      /  AST  48     /  ALT  99     /  AlkPhos  82     29 Nov 2017 11:24          Blood Culture:         RADIOLOGY:    EKG: No 12 lead EKG available at time of my exam

## 2017-11-29 NOTE — H&P ADULT - NSHPREVIEWOFSYSTEMS_GEN_ALL_CORE
REVIEW OF SYSTEM:  CONSTITUTIONAL: (+) fevers or chills  EYES/ENT: No visual changes;  No vertigo or throat pain.  NECK: No pain or stiffness  RESPIRATORY: No cough, wheezing, hemoptysis; No shortness of breath  CARDIOVASCULAR: No chest pain or palpitations  GASTROINTESTINAL: (+) abdominal pain. (+) nausea, no vomiting, or hematemesis; (+) diarrhea (+) constipation. No melena, (+) hematochezia.  GENITOURINARY: No dysuria, frequency or hematuria  NEUROLOGICAL: No numbness or weakness  MUSCULOSKELETAL:   SKIN: No itching, burning, rashes, or lesions   All other review of systems is negative unless indicated above.

## 2017-11-30 DIAGNOSIS — F17.210 NICOTINE DEPENDENCE, CIGARETTES, UNCOMPLICATED: ICD-10-CM

## 2017-11-30 DIAGNOSIS — Z29.9 ENCOUNTER FOR PROPHYLACTIC MEASURES, UNSPECIFIED: ICD-10-CM

## 2017-11-30 DIAGNOSIS — R74.8 ABNORMAL LEVELS OF OTHER SERUM ENZYMES: ICD-10-CM

## 2017-11-30 LAB
AMPHET UR-MCNC: NEGATIVE — SIGNIFICANT CHANGE UP
ANION GAP SERPL CALC-SCNC: 3 MMOL/L — LOW (ref 5–17)
BARBITURATES UR SCN-MCNC: NEGATIVE — SIGNIFICANT CHANGE UP
BENZODIAZ UR-MCNC: POSITIVE — SIGNIFICANT CHANGE UP
BUN SERPL-MCNC: 6 MG/DL — LOW (ref 7–23)
CALCIUM SERPL-MCNC: 8.4 MG/DL — LOW (ref 8.5–10.1)
CHLORIDE SERPL-SCNC: 107 MMOL/L — SIGNIFICANT CHANGE UP (ref 96–108)
CO2 SERPL-SCNC: 31 MMOL/L — SIGNIFICANT CHANGE UP (ref 22–31)
COCAINE METAB.OTHER UR-MCNC: POSITIVE — SIGNIFICANT CHANGE UP
CREAT SERPL-MCNC: 1.1 MG/DL — SIGNIFICANT CHANGE UP (ref 0.5–1.3)
CULTURE RESULTS: NO GROWTH — SIGNIFICANT CHANGE UP
GLUCOSE SERPL-MCNC: 87 MG/DL — SIGNIFICANT CHANGE UP (ref 70–99)
HAV IGM SER-ACNC: SIGNIFICANT CHANGE UP
HBV CORE AB SER-ACNC: SIGNIFICANT CHANGE UP
HBV CORE IGM SER-ACNC: SIGNIFICANT CHANGE UP
HBV SURFACE AB SER-ACNC: SIGNIFICANT CHANGE UP
HBV SURFACE AG SER-ACNC: SIGNIFICANT CHANGE UP
HCT VFR BLD CALC: 37 % — LOW (ref 39–50)
HCV AB S/CO SERPL IA: 1.9 S/CO — SIGNIFICANT CHANGE UP
HCV AB SERPL-IMP: ABNORMAL
HGB BLD-MCNC: 12.1 G/DL — LOW (ref 13–17)
MAGNESIUM SERPL-MCNC: 2.2 MG/DL — SIGNIFICANT CHANGE UP (ref 1.6–2.6)
MCHC RBC-ENTMCNC: 29 PG — SIGNIFICANT CHANGE UP (ref 27–34)
MCHC RBC-ENTMCNC: 32.7 GM/DL — SIGNIFICANT CHANGE UP (ref 32–36)
MCV RBC AUTO: 88.6 FL — SIGNIFICANT CHANGE UP (ref 80–100)
METHADONE UR-MCNC: NEGATIVE — SIGNIFICANT CHANGE UP
OPIATES UR-MCNC: POSITIVE — SIGNIFICANT CHANGE UP
PCP SPEC-MCNC: SIGNIFICANT CHANGE UP
PCP UR-MCNC: NEGATIVE — SIGNIFICANT CHANGE UP
PHOSPHATE SERPL-MCNC: 3 MG/DL — SIGNIFICANT CHANGE UP (ref 2.5–4.5)
PLATELET # BLD AUTO: 493 K/UL — HIGH (ref 150–400)
POTASSIUM SERPL-MCNC: 4.5 MMOL/L — SIGNIFICANT CHANGE UP (ref 3.5–5.3)
POTASSIUM SERPL-SCNC: 4.5 MMOL/L — SIGNIFICANT CHANGE UP (ref 3.5–5.3)
RBC # BLD: 4.17 M/UL — LOW (ref 4.2–5.8)
RBC # FLD: 12.2 % — SIGNIFICANT CHANGE UP (ref 10.3–14.5)
SODIUM SERPL-SCNC: 141 MMOL/L — SIGNIFICANT CHANGE UP (ref 135–145)
SPECIMEN SOURCE: SIGNIFICANT CHANGE UP
THC UR QL: NEGATIVE — SIGNIFICANT CHANGE UP
WBC # BLD: 12.7 K/UL — HIGH (ref 3.8–10.5)
WBC # FLD AUTO: 12.7 K/UL — HIGH (ref 3.8–10.5)

## 2017-11-30 PROCEDURE — 99233 SBSQ HOSP IP/OBS HIGH 50: CPT

## 2017-11-30 RX ADMIN — PIPERACILLIN AND TAZOBACTAM 25 GRAM(S): 4; .5 INJECTION, POWDER, LYOPHILIZED, FOR SOLUTION INTRAVENOUS at 21:06

## 2017-11-30 RX ADMIN — SODIUM CHLORIDE 80 MILLILITER(S): 9 INJECTION, SOLUTION INTRAVENOUS at 21:06

## 2017-11-30 RX ADMIN — PIPERACILLIN AND TAZOBACTAM 25 GRAM(S): 4; .5 INJECTION, POWDER, LYOPHILIZED, FOR SOLUTION INTRAVENOUS at 06:06

## 2017-11-30 RX ADMIN — PANTOPRAZOLE SODIUM 40 MILLIGRAM(S): 20 TABLET, DELAYED RELEASE ORAL at 12:19

## 2017-11-30 RX ADMIN — MORPHINE SULFATE 4 MILLIGRAM(S): 50 CAPSULE, EXTENDED RELEASE ORAL at 03:41

## 2017-11-30 RX ADMIN — MORPHINE SULFATE 4 MILLIGRAM(S): 50 CAPSULE, EXTENDED RELEASE ORAL at 18:14

## 2017-11-30 RX ADMIN — MORPHINE SULFATE 4 MILLIGRAM(S): 50 CAPSULE, EXTENDED RELEASE ORAL at 04:10

## 2017-11-30 RX ADMIN — MORPHINE SULFATE 4 MILLIGRAM(S): 50 CAPSULE, EXTENDED RELEASE ORAL at 18:02

## 2017-11-30 RX ADMIN — MORPHINE SULFATE 4 MILLIGRAM(S): 50 CAPSULE, EXTENDED RELEASE ORAL at 09:55

## 2017-11-30 RX ADMIN — PIPERACILLIN AND TAZOBACTAM 25 GRAM(S): 4; .5 INJECTION, POWDER, LYOPHILIZED, FOR SOLUTION INTRAVENOUS at 15:38

## 2017-11-30 RX ADMIN — MORPHINE SULFATE 4 MILLIGRAM(S): 50 CAPSULE, EXTENDED RELEASE ORAL at 09:42

## 2017-11-30 NOTE — PROGRESS NOTE ADULT - SUBJECTIVE AND OBJECTIVE BOX
[facility] -- Joyce Wade, Michaela Jenkins, Matti Walker Savella  Office # [phone]      Follow Up:  SVT    Subjective/Observations: Patient seen and examined. Events noted. Resting comfortably in bed. No complaints of chest pain, dyspnea, or palpitations reported.       REVIEW OF SYSTEMS: All other review of systems is negative unless indicated above    PAST MEDICAL & SURGICAL HISTORY:  No pertinent past medical history  Arm laceration: repair of arm laceration 1995- tendon repair  S/P shoulder surgery: left      MEDICATIONS  (STANDING):  lactated ringers. 1000 milliLiter(s) (80 mL/Hr) IV Continuous <Continuous>  pantoprazole  Injectable 40 milliGRAM(s) IV Push daily  piperacillin/tazobactam IVPB. 3.375 Gram(s) IV Intermittent every 8 hours    MEDICATIONS  (PRN):  acetaminophen   Tablet 650 milliGRAM(s) Oral every 6 hours PRN For Temp greater than 38 C (100.4 F)  morphine  - Injectable 2 milliGRAM(s) IV Push every 4 hours PRN Moderate Pain (4 - 6)  morphine  - Injectable 4 milliGRAM(s) IV Push every 6 hours PRN Severe Pain (7 - 10)  nicotine - 21 mG/24Hr(s) Patch 1 patch Transdermal daily PRN nicotine w/d sx  ondansetron Injectable 4 milliGRAM(s) IV Push every 8 hours PRN Nausea and/or Vomiting      Allergies    No Known Allergies    Intolerances            Vital Signs Last 24 Hrs  T(C): 36.8 (30 Nov 2017 21:06), Max: 37.4 (30 Nov 2017 13:13)  T(F): 98.3 (30 Nov 2017 21:06), Max: 99.4 (30 Nov 2017 13:13)  HR: 74 (30 Nov 2017 21:06) (67 - 81)  BP: 122/73 (30 Nov 2017 21:06) (120/79 - 129/79)  BP(mean): --  RR: 17 (30 Nov 2017 21:06) (17 - 17)  SpO2: 96% (30 Nov 2017 21:06) (96% - 99%)    I&O's Summary    29 Nov 2017 07:01  -  30 Nov 2017 07:00  --------------------------------------------------------  IN: 640 mL / OUT: 740 mL / NET: -100 mL    30 Nov 2017 07:01  -  30 Nov 2017 21:10  --------------------------------------------------------  IN: 1060 mL / OUT: 0 mL / NET: 1060 mL      Weight (kg): 94.5 (11-29 @ 21:32)    PHYSICAL EXAM:  TELE: SR  Constitutional: NAD, awake and alert, well-developed  HEENT: Moist Mucous Membranes, Anicteric  Pulmonary: Non-labored, breath sounds are clear bilaterally, No wheezing, rales or rhonchi  Cardiovascular: Regular, S1 and S2, No murmurs, rubs, gallops or clicks  Gastrointestinal: Bowel Sounds present, soft, nontender.   Lymph: No peripheral edema. No lymphadenopathy.  Psych:  Mood & affect appropriate    LABS: All Labs Reviewed:                        12.1   12.7  )-----------( 493      ( 30 Nov 2017 03:49 )             37.0                         11.2   17.5  )-----------( 830      ( 29 Nov 2017 19:49 )             35.1                         13.0   16.8  )-----------( 589      ( 29 Nov 2017 11:24 )             40.0     30 Nov 2017 03:48    141    |  107    |  6      ----------------------------<  87     4.5     |  31     |  1.10   29 Nov 2017 19:49    141    |  107    |  8      ----------------------------<  91     4.0     |  28     |  1.20   29 Nov 2017 11:24    141    |  104    |  10     ----------------------------<  84     4.5     |  31     |  1.90     Ca    8.4        30 Nov 2017 03:48  Ca    7.3        29 Nov 2017 19:49  Ca    8.6        29 Nov 2017 11:24  Phos  3.0       30 Nov 2017 03:48  Mg     2.2       30 Nov 2017 03:48  Mg     1.9       29 Nov 2017 19:49    TPro  7.6    /  Alb  2.3    /  TBili  0.2    /  DBili  x      /  AST  48     /  ALT  99     /  AlkPhos  82     29 Nov 2017 11:24

## 2017-11-30 NOTE — CONSULT NOTE ADULT - SUBJECTIVE AND OBJECTIVE BOX
HPI:  46 Y.O. M presents to [facility] ED with 1 month history of worsening abdominal pain. Pt reports pain started spontaneously and is globally about the abdomen and somewhat worse in the right lower quadrant region. Pt reports mild nausea and intermittent greenish diarrhea x past 1 month. Pt denies any history of abdominal surgery in the past but does report episode of colitis ~20 years ago treated with IV antibiotics only and no surgical intervention. Pt reports having a colonoscopy at the time but does not recall having any recent colonoscopy or upper endoscopy. Pt reports having lower grade fevers  and chills for the past few days. Pt reports 1 pisode of blood in his stool yesterday with no recurrence. Pt reports taking xanax and tylenol as needed for pain. Pt reports the symptoms have remained the same but patient decided to see PMD- Dr. Gates and Dr. Alcala today and was sent to ED for further management. Pt underwent CT Abd/Pelvis upon arrival to ED which revealed- Large thick walled collection in the lower abdomen in the midline suggestive of abscess with air-fluid level. A loop of small bowel is draped around this collection however the origin may be from adjacent diverticulitis in the proximal sigmoid colon. Nonobstructive right renal calculus. Thickened and nodular adrenal glands suggests hyperplasia. Mild thickening of the distal esophagus.    Denies any recent CP, SOB, dysuria. (2017 16:17)      PAST MEDICAL & SURGICAL HISTORY:  No pertinent past medical history  Arm laceration: repair of arm laceration - tendon repair  S/P shoulder surgery: left      REVIEW OF SYSTEMS:    CONSTITUTIONAL: No fever, no weight loss, no fatigue  EYES: No eye pain, no visual disturbances  ENMT:  No difficulty hearing, no tinnitus, no vertigo; No sinus or throat pain  NECK: No pain or stiffness  RESPIRATORY: No cough, no wheezing, no chills, no hemoptysis; No shortness of breath  CARDIOVASCULAR: No chest pain, palpitations, dizziness, or leg swelling  GASTROINTESTINAL: decreased abdominal pain. No nausea, no vomiting, no hematemesis; No diarrhea, no constipation. No melena, no hematochezia.  GENITOURINARY: No dysuria, no frequency, no hematuria, no incontinence  NEUROLOGICAL: No headaches, no memory loss, no loss of strength, no numbness, no tremors  SKIN: No itching, no burning, no rashes   LYMPH NODES: No enlarged glands  ENDOCRINE: No heat, no cold intolerance; No hair loss  MUSCULOSKELETAL: No joint pain, no swelling; No muscle pain, no back pain, no extremity pain  PSYCHIATRIC: No depression, no anxiety, no mood swings, no difficulty sleeping  HEME/LYMPH: No easy bruising, no bleeding gums  ALLERGY AND IMMUNOLOGIC: No hives, no eczema      MEDICATIONS  (STANDING):  lactated ringers. 1000 milliLiter(s) (80 mL/Hr) IV Continuous <Continuous>  pantoprazole  Injectable 40 milliGRAM(s) IV Push daily  piperacillin/tazobactam IVPB. 3.375 Gram(s) IV Intermittent every 8 hours    MEDICATIONS  (PRN):  acetaminophen   Tablet 650 milliGRAM(s) Oral every 6 hours PRN For Temp greater than 38 C (100.4 F)  morphine  - Injectable 2 milliGRAM(s) IV Push every 4 hours PRN Moderate Pain (4 - 6)  morphine  - Injectable 4 milliGRAM(s) IV Push every 6 hours PRN Severe Pain (7 - 10)  nicotine - 21 mG/24Hr(s) Patch 1 patch Transdermal daily PRN nicotine w/d sx  ondansetron Injectable 4 milliGRAM(s) IV Push every 8 hours PRN Nausea and/or Vomiting      Allergies    No Known Allergies    Intolerances        SOCIAL HISTORY:    FAMILY HISTORY:  No pertinent family history in first degree relatives      Vital Signs Last 24 Hrs  T(C): 37.1 (2017 09:02), Max: 37.4 (2017 17:30)  T(F): 98.8 (2017 09:02), Max: 99.4 (2017 17:30)  HR: 74 (2017 09:02) (67 - 99)  BP: 122/73 (2017 09:02) (112/55 - 129/79)  BP(mean): --  RR: 17 (2017 09:02) (14 - 17)  SpO2: 98% (2017 09:02) (95% - 100%)    PHYSICAL EXAM:    GENERAL: NAD  HEAD:  Atraumatic, Normocephalic  EYES: EOMI, PERRLA, conjunctiva and sclera clear  ENMT: No tonsillar erythema, exudates, or enlargement; Moist mucous membranes  NECK: Supple, No JVD, Normal thyroid  NERVOUS SYSTEM:  Alert & Oriented X3, Motor Strength 5/5 B/L upper and lower extremities;  CHEST/LUNG: Clear to percussion bilaterally; No rales, rhonchi, wheezing, or rubs  HEART: Regular rate and rhythm; No murmurs, rubs, or gallops  ABDOMEN: Soft, mildly tender, Nondistended; Bowel sounds present; drain in place draining purulent fluid  EXTREMITIES:  2+ Peripheral Pulses, No clubbing, cyanosis, or edema  LYMPH: No lymphadenopathy   SKIN: No rashes or lesions      LABS:                        12.1   12.7  )-----------( 493      ( 2017 03:49 )             37.0         141  |  107  |  6<L>  ----------------------------<  87  4.5   |  31  |  1.10    Ca    8.4<L>      2017 03:48  Phos  3.0       Mg     2.2         TPro  7.6  /  Alb  2.3<L>  /  TBili  0.2  /  DBili  x   /  AST  48<H>  /  ALT  99<H>  /  AlkPhos  82        Urinalysis Basic - ( 2017 12:56 )    Color: Yellow / Appearance: Clear / S.015 / pH: x  Gluc: x / Ketone: Negative  / Bili: Negative / Urobili: Negative   Blood: x / Protein: Negative / Nitrite: Negative   Leuk Esterase: Negative / RBC: 3-5 /HPF / WBC 3-5   Sq Epi: x / Non Sq Epi: Occasional / Bacteria: Negative        RADIOLOGY & ADDITIONAL STUDIES:

## 2017-11-30 NOTE — CONSULT NOTE ADULT - PROBLEM SELECTOR RECOMMENDATION 3
pt uses cocaine and marijuana recreationally  counseling  will check U tox  discussed with pt
Improving with IVF  Continue IVF  Trend labs

## 2017-11-30 NOTE — PROGRESS NOTE ADULT - SUBJECTIVE AND OBJECTIVE BOX
Date/Time Patient Seen:  		  Referring MD:   Data Reviewed	       Patient is a 46y old  Male who presents with a chief complaint of Abdominal pain for one month. nausea, vomiting, fever since yesterday (29 Nov 2017 22:54)  in bed  seen and examined  vs and meds reviewed  on Room Air  on IVF      Subjective/HPI     PAST MEDICAL & SURGICAL HISTORY:  No pertinent past medical history  Arm laceration: repair of arm laceration 1995- tendon repair  S/P shoulder surgery: left        Medication list         MEDICATIONS  (STANDING):  lactated ringers. 1000 milliLiter(s) (80 mL/Hr) IV Continuous <Continuous>  pantoprazole  Injectable 40 milliGRAM(s) IV Push daily  piperacillin/tazobactam IVPB. 3.375 Gram(s) IV Intermittent every 8 hours    MEDICATIONS  (PRN):  acetaminophen   Tablet 650 milliGRAM(s) Oral every 6 hours PRN For Temp greater than 38 C (100.4 F)  morphine  - Injectable 2 milliGRAM(s) IV Push every 4 hours PRN Moderate Pain (4 - 6)  morphine  - Injectable 4 milliGRAM(s) IV Push every 6 hours PRN Severe Pain (7 - 10)  nicotine - 21 mG/24Hr(s) Patch 1 patch Transdermal daily PRN nicotine w/d sx  ondansetron Injectable 4 milliGRAM(s) IV Push every 8 hours PRN Nausea and/or Vomiting         Vitals log        ICU Vital Signs Last 24 Hrs  T(C): 37.1 (30 Nov 2017 05:01), Max: 37.4 (29 Nov 2017 17:30)  T(F): 98.7 (30 Nov 2017 05:01), Max: 99.4 (29 Nov 2017 17:30)  HR: 73 (30 Nov 2017 05:01) (67 - 99)  BP: 128/77 (30 Nov 2017 05:01) (112/55 - 130/74)  BP(mean): --  ABP: --  ABP(mean): --  RR: 17 (30 Nov 2017 05:01) (14 - 17)  SpO2: 99% (30 Nov 2017 05:01) (95% - 100%)           Input and Output:  I&O's Detail    29 Nov 2017 07:01  -  30 Nov 2017 06:09  --------------------------------------------------------  IN:  Total IN: 0 mL    OUT:    Drain: 170 mL    Voided: 500 mL  Total OUT: 670 mL    Total NET: -670 mL          Lab Data                        12.1   12.7  )-----------( 493      ( 30 Nov 2017 03:49 )             37.0     11-30    141  |  107  |  6<L>  ----------------------------<  87  4.5   |  31  |  1.10    Ca    8.4<L>      30 Nov 2017 03:48  Phos  3.0     11-30  Mg     2.2     11-30    TPro  7.6  /  Alb  2.3<L>  /  TBili  0.2  /  DBili  x   /  AST  48<H>  /  ALT  99<H>  /  AlkPhos  82  11-29            Review of Systems	      Objective     Physical Examination  head at  heart - s1s2  lungs - dec BS  abd - soft        Pertinent Lab findings & Imaging      Elvie:  NO   Adequate UO     I&O's Detail    29 Nov 2017 07:01  -  30 Nov 2017 06:09  --------------------------------------------------------  IN:  Total IN: 0 mL    OUT:    Drain: 170 mL    Voided: 500 mL  Total OUT: 670 mL    Total NET: -670 mL               Discussed with:     Cultures:	        Radiology

## 2017-11-30 NOTE — CONSULT NOTE ADULT - PROBLEM SELECTOR RECOMMENDATION 2
I edouard  mobilize  keep sat > 88 pct
GI consult  check ASCA/ANCA to screen for Crohns  Trend labs
check viral hepatitis serologies   liver appears normal on CT imaging  trend liver enzymes

## 2017-11-30 NOTE — PROGRESS NOTE ADULT - SUBJECTIVE AND OBJECTIVE BOX
UCHE BE  [id number]    GENERAL SURGERY/ DR. BEDOLLA    Vital Signs Last 24 Hrs  T(C): 37.1 (30 Nov 2017 05:01), Max: 37.4 (29 Nov 2017 17:30)  T(F): 98.7 (30 Nov 2017 05:01), Max: 99.4 (29 Nov 2017 17:30)  HR: 73 (30 Nov 2017 05:01) (67 - 99)  BP: 128/77 (30 Nov 2017 05:01) (112/55 - 130/74)  BP(mean): --  RR: 17 (30 Nov 2017 05:01) (14 - 17)  SpO2: 99% (30 Nov 2017 05:01) (95% - 100%)  IR CATH TOTAL 240 ML PUS         LUNGS:         CLEAR TO AUSCULTATION , NO W/R/R  ABDOMEN    IR CATH IN PLACE, JOAN PUS NOTED THE DRAINAGE BAG                      + BS, SOFT, NON DISTENDED, SOME ITENDERNESS AT THE CATH SITE   EXTREMITY:  NO EDEMA, NO CALF TENDERNESS                            12.1   12.7  )-----------( 493      ( 30 Nov 2017 03:49 )             37.0      11-30    141  |  107  |  6<L>  ----------------------------<  87  4.5   |  31  |  1.10    Ca    8.4<L>      30 Nov 2017 03:48  Phos  3.0     11-30  Mg     2.2     11-30    TPro  7.6  /  Alb  2.3<L>  /  TBili  0.2  /  DBili  x   /  AST  48<H>  /  ALT  99<H>  /  AlkPhos  82  11-29    Culture - Body Fluid with Gram Stain (11.29.17 @ 21:24)    Gram Stain:   Numerous polymorphonuclear leukocytes per low power field  Numerous Gram Positive Cocci in Pairs and Chains per oil power field  Numerous Gram Negative Rods per oil power field    Specimen Source: Abdominal Fl Abdominal Fluid                            ASSESSMENT &  PLAN:  PERFORATED DIVERTICULITIS WITH PELVIC ABSCESS S/P CT GUIDED DRAINAGE 11/29/2017 IN IR ( 120 CC JOAN PUS )     BUN/ CREAT IMPROVED   CONTINUE WITH NPO, IV FLUID HYDRATION, IV ANTIBIOTICS   MONITOR DRAINAGE   F/U CULTURE     CRITICAL CARE/ PULMONARY DR. SANCHEZ , CARDIOLOGY CONSULT AND F/U NOTED UCHE BE  MRN-[id number] 46y    GENERAL SURGERY/ DR. BEDOLLA    Vital Signs Last 24 Hrs  T(C): 37.1 (30 Nov 2017 05:01), Max: 37.4 (29 Nov 2017 17:30)  T(F): 98.7 (30 Nov 2017 05:01), Max: 99.4 (29 Nov 2017 17:30)  HR: 73 (30 Nov 2017 05:01) (67 - 99)  BP: 128/77 (30 Nov 2017 05:01) (112/55 - 130/74)  BP(mean): --  RR: 17 (30 Nov 2017 05:01) (14 - 17)  SpO2: 99% (30 Nov 2017 05:01) (95% - 100%)  IR CATH TOTAL 240 ML PUS         LUNGS:         CLEAR TO AUSCULTATION , NO W/R/R  ABDOMEN    IR CATH IN PLACE, JOAN PUS NOTED THE DRAINAGE BAG                      + BS, SOFT, NON DISTENDED, SOME ITENDERNESS AT THE CATH SITE   EXTREMITY:  NO EDEMA, NO CALF TENDERNESS                            12.1   12.7  )-----------( 493      ( 30 Nov 2017 03:49 )             37.0      11-30    141  |  107  |  6<L>  ----------------------------<  87  4.5   |  31  |  1.10    Ca    8.4<L>      30 Nov 2017 03:48  Phos  3.0     11-30  Mg     2.2     11-30    TPro  7.6  /  Alb  2.3<L>  /  TBili  0.2  /  DBili  x   /  AST  48<H>  /  ALT  99<H>  /  AlkPhos  82  11-29    Culture - Body Fluid with Gram Stain (11.29.17 @ 21:24)    Gram Stain:   Numerous polymorphonuclear leukocytes per low power field  Numerous Gram Positive Cocci in Pairs and Chains per oil power field  Numerous Gram Negative Rods per oil power field    Specimen Source: Abdominal Fl Abdominal Fluid                            ASSESSMENT &  PLAN:  PERFORATED DIVERTICULITIS WITH PELVIC ABSCESS S/P CT GUIDED DRAINAGE 11/29/2017 IN IR ( 120 CC JOAN PUS )     BUN/ CREAT IMPROVED   CONTINUE WITH NPO, IV FLUID HYDRATION, IV ANTIBIOTICS   MONITOR DRAINAGE   F/U CULTURE     CRITICAL CARE/ PULMONARY DR. SANCHEZ , CARDIOLOGY CONSULT AND F/U NOTED     ID CONSULT DR. CARDENAS CALLED

## 2017-11-30 NOTE — PROGRESS NOTE ADULT - ASSESSMENT
46 M presents to [place] ED with 1 month history of worsening abdominal pain, with diverticulitis of large intestine with abscess, s/p abscess drainage, with episode of SVT during procedure, now back in SR. This episode of SVT presumably occurred in the setting of stress. There is also the history of recent drug use  Did not require any medications, and converted back to SR without adenosine.    - Maintaining SR  - Hold off on AVN blockers for now  - Cont tele for today and dc rosaura if no other events.    - Antibiotics  - echo (can be as outpt)  - Monitor and replete electrolytes. Keep K>4.0 and Mg>2.0.  - Further cardiac workup will depend on clinical course.   - All other workup per primary team. Will followup.

## 2017-11-30 NOTE — CONSULT NOTE ADULT - PROBLEM SELECTOR RECOMMENDATION 7
episode of atrial arrhythmia  cardio eval  monitor lytes  pain control  u tox pending  IVF  cardiac monitor  discussed case with ICU team for possible transfer to ICU if pt condition deteriorates   will follow  thyroid panel ordered
Reports using cocaine over month ago  Check urine tox

## 2017-11-30 NOTE — CONSULT NOTE ADULT - PROBLEM SELECTOR RECOMMENDATION 6
will need GI eval and follow up non emergently
Nicotine patch  The patient’s tobacco use - (  x  ) YES   (     ) NO     Advised to quit and impact of smoking•  ( x    )  Yes   (     ) N0    Assessed willingness to attempt to quit•  ( x   ) YES   (     ) No    Providing methods, Resources and skills for cessation• (    ) Yes   (    )  No     (  x   ) Refused    Medication management of smoking session drugs•	(  x  )  yes  (    ) No    setting quit date• (      )  Yes   (      ) No	(   x ) not at this time    Follow-up arranged•  (      )    (      ) No	( x   ) refused    Amount of time spent counseling patient   ( x   )  >10min     (       )  <10 min

## 2017-11-30 NOTE — PROGRESS NOTE ADULT - ATTENDING COMMENTS
Pt seen and examined. Above note red in full. Chart reviewed. I discussed plan of care with the pt and his mother. .  1/antibiotics ID called  2/Repeat ct with tube check  3/GI Dr. Escobedo  4/Address with the pt Cocaine use and its repercussion

## 2017-11-30 NOTE — CONSULT NOTE ADULT - PROBLEM SELECTOR RECOMMENDATION 5
[facility]  IVF  I and O  BP control  rpt labs at 8 pm / lytes  on IVF  am labs ordered
Had run of SVT in ER  Cardiac monitor  Cardiology f/u  ECHO

## 2017-11-30 NOTE — CONSULT NOTE ADULT - SUBJECTIVE AND OBJECTIVE BOX
[facility], Division of Infectious Diseases  EMANUEL Prado A. Lee RUGGIERO, UCHE  46y, Male  [id number]    HPI--  46M no medical care for many years, presents to the hospital after he passed "green mucus" per rectum. Patient had acute onset of abdominal pain 1 month ago. Pain started acutely about "3 hours after eating [employer] fried chicken." Patient does not recall swallowing a bone or any dysphagia/odynophagia. Pain was between umbilicus and suprapubic area mostly, also in the RLQ. Pain did not radiate. Pain was severe enough that patient states he has not worked in a month and spent most of his time in bed. Subjective fevers (did not take temperature), with chills and sweats. No diarrhea. Patient thought he might have Salmonella but did not seek medical attention at any point in time.      Denies similar episodes, but had "colitis 20 years ago" for which he had a colonoscopy, diagnosis unknown. Does not recall personally or in the family any diagnosis of UC, Crohn's, IBD, or Colon cancer. No frequent diarrhea or BRBPR. No urinary symptoms, nor hx nephrolithiasis.    Here CT A/P scan with a large abscess. He's now s/p IR drainage and is feeling better. Pain much, much improved.    PMH/PSH--  Colitis as above  Arm laceration-- degloving incident?  S/P shoulder surgery  Denies and HTN, DM, heart problems, lung problems, kidney problems. or history of malignancy.    Allergies-- denies.       Medications--  Antibiotics: piperacillin/tazobactam IVPB. 3.375 Gram(s) IV Intermittent every 8 hours    Immunologic:   Other: acetaminophen   Tablet PRN  lactated ringers.  morphine  - Injectable PRN  morphine  - Injectable PRN  nicotine - 21 mG/24Hr(s) Patch PRN  ondansetron Injectable PRN  pantoprazole  Injectable      Social History--  EtOH: opccasional  Tobacco: 1.5 PPD since age 15  Drug Use: Smokes freebase cocaine about 1x/month    Family/Marital History--  Prostate cancer  No pertinent family history in first degree relatives  Remainder not relevant to clinical concern.    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    Review of Systems:  A >=10-point review of systems was obtained.     Pertinent positives and negatives--  Constitutional: +fevers. + Chills. No Rigors.   Eyes: denies.   ENMT: denies.   Cardiovascular: No chest pain. No palpitations.  Respiratory: No shortness of breath. No cough.  Gastrointestinal: No nausea or vomiting. No diarrhea or constipation.   Genitourinary: denies.   Musculoskeletal: denies.   Skin: denies.   Neurologic: denies.   Psychiatric: Pleasant. Appropriate affect.    Review of systems otherwise negative except as previously noted.    Physical Exam--  Vital Signs: T(F): 98.8 (11-30-17 @ 09:02), Max: 99.4 (11-29-17 @ 17:30)  HR: 74 (11-30-17 @ 09:02)  BP: 122/73 (11-30-17 @ 09:02)  RR: 17 (11-30-17 @ 09:02)  SpO2: 98% (11-30-17 @ 09:02)  Wt(kg): --  General: Nontoxic-appearing Male in no acute distress.  HEENT: AT/NC. PERRL. EOMI. Anicteric. Conjunctiva pink and moist. Oropharynx clear. Dentition fair.  Neck: Not rigid. No sense of mass.  Nodes: None palpable.  Lungs: Clear bilaterally without rales, wheezing or rhonchi  Heart: Regular rate and rhythm. No Murmur. No rub. No gallop. No palpable thrill.  Abdomen: Bowel sounds present and normoactive. Soft. Nondistended. Nontender. No sense of mass. No organomegaly. Drain -> taupe purulent fluid.  Back: No spinal tenderness. No costovertebral angle tenderness.   Extremities: No cyanosis or clubbing. No edema.   Skin: Warm. Dry. Good turgor. No rash. No vasculitic stigmata. Scarring RUE from prior surgery and also rounded areas which patient staets are related to work/molten metal burn injury. No tract marks   Psychiatric: Appropriate affect and mood for situation.         Laboratory & Imaging Data--  CBC                        12.1   12.7  )-----------( 493      ( 30 Nov 2017 03:49 )             37.0     WBC Count: 16.8 K/uL (11.29.17 @ 11:24)      Chemistries  11-30    141  |  107  |  6<L>  ----------------------------<  87  4.5   |  31  |  1.10    Ca    8.4<L>      30 Nov 2017 03:48  Phos  3.0     11-30  Mg     2.2     11-30    TPro  7.6  /  Alb  2.3<L>  /  TBili  0.2  /  DBili  x   /  AST  48<H>  /  ALT  99<H>  /  AlkPhos  82  11-29      Culture Data  Culture - Body Fluid with Gram Stain (collected 11-29-17 @ 21:24)  Source: Abdominal Fl Abdominal Fluid  Gram Stain (11-29-17 @ 22:48):    Numerous polymorphonuclear leukocytes per low power field    Numerous Gram Positive Cocci in Pairs and Chains per oil power field    Numerous Gram Negative Rods per oil power field    < from: CT Abdomen and Pelvis w/ Oral Cont (11.29.17 @ 13:59) >  EXAM:  CT ABDOMEN AND PELVIS OC                        PROCEDURE DATE:  11/29/2017    INTERPRETATION:  CLINICAL STATEMENT: diffuse lower abd pain    TECHNIQUE: CT of the abdomen and pelvis was performed without IV   contrast.  Oral contrast was administered. This scan was performed using   automatic exposure control (radiation dose reduction software) to obtain   a diagnostic image quality scan with patient dose as low as reasonably   achievable.     COMPARISON: None.    FINDINGS:  The lower chest is remarkable for mild thickening of the distal esophagus.    The evaluation of the abdominal viscera is limited without IV contrast.    The liver, spleen, and pancreas are grossly unremarkable. The adrenal   glands are thickened andnodular bilaterally suggesting hyperplasia The   partly contracted gallbladder is seen.    The kidneys are remarkable for punctate calcification in the right lower   pole. The fluid-filled bladder is unremarkable.    There is no bowel obstruction.  Probable duodenal diverticulum is noted.   There is significant diverticulosis of the sigmoid colon. There is a   fat-containing left inguinal hernia. There is a thick walled cystic mass   with an air-fluid level in the lower abdomen in the midline inseparable   from adjacent small bowel loop. This measures 10.7 x 7.0 x 6.9 cm.   Inflammatory changes are noted in the fat around the proximal sigmoid   colon. While this abscess is not directly contiguous with the sigmoid   colon, statistically this may be a diverticular abscess especially with   long-standing history of pain.    There is no intraperitoneal free air.  There is no free fluid. there is   no significant retroperitoneal or pelvic lymphadenopathy. The aorta is   not aneurysmal.    There is no abdominal or pelvic lymphadenopathy.  The pelvic structures   are grossly unremarkable.    The osseous structures demonstrate degenerative changes of the spine and   right hip. There is significant L5-S1 disc space narrowing..    IMPRESSION: Large thick walled collection in the lower abdomen in the   midline suggestive of abscess with air-fluid level. A loop of small bowel   is draped around this collection however the origin may be from adjacent   diverticulitis in the proximal sigmoid colon.  Nonobstructive right renal calculus  Thickened and nodular adrenal glands suggests hyperplasia.   Mild thickening of the distal esophagus  Results were discussed with Dr. Chao at 3:10 PM on November 29, 2017    MIN HEAD M.D.,ATTENDING RADIOLOGIST  This document has been electronically signed. Nov 29 2017  3:11PM  < end of copied text >

## 2017-11-30 NOTE — CONSULT NOTE ADULT - SUBJECTIVE AND OBJECTIVE BOX
Chief Complaint:  Patient is a 46y old  Male who presents with a chief complaint of Abdominal pain for one month. nausea, vomiting, fever since yesterday (2017 22:54)      HPI:    Allergies:  No Known Allergies      Medications:  acetaminophen   Tablet 650 milliGRAM(s) Oral every 6 hours PRN  lactated ringers. 1000 milliLiter(s) IV Continuous <Continuous>  morphine  - Injectable 2 milliGRAM(s) IV Push every 4 hours PRN  morphine  - Injectable 4 milliGRAM(s) IV Push every 6 hours PRN  nicotine - 21 mG/24Hr(s) Patch 1 patch Transdermal daily PRN  ondansetron Injectable 4 milliGRAM(s) IV Push every 8 hours PRN  pantoprazole  Injectable 40 milliGRAM(s) IV Push daily  piperacillin/tazobactam IVPB. 3.375 Gram(s) IV Intermittent every 8 hours      PMHX/PSHX:  No pertinent past medical history  Arm laceration  S/P shoulder surgery      Family history:  No pertinent family history in first degree relatives      Social History:     ROS:     General:  No wt loss, fevers, chills, night sweats, fatigue,   Eyes:  Good vision, no reported pain  ENT:  No sore throat, pain, runny nose, dysphagia  CV:  No pain, palpitations, hypo/hypertension  Resp:  No dyspnea, cough, tachypnea, wheezing  GI:  No pain, No nausea, No vomiting, No diarrhea, No constipation, No weight loss, No fever, No pruritis, No rectal bleeding, No tarry stools, No dysphagia,  :  No pain, bleeding, incontinence, nocturia  Muscle:  No pain, weakness  Neuro:  No weakness, tingling, memory problems  Psych:  No fatigue, insomnia, mood problems, depression  Endocrine:  No polyuria, polydipsia, cold/heat intolerance  Heme:  No petechiae, ecchymosis, easy bruisability  Skin:  No rash, tattoos, scars, edema      PHYSICAL EXAM:   Vital Signs:  Vital Signs Last 24 Hrs  T(C): 37.1 (2017 09:02), Max: 37.4 (2017 17:30)  T(F): 98.8 (2017 09:02), Max: 99.4 (2017 17:30)  HR: 74 (2017 09:02) (67 - 99)  BP: 122/73 (2017 09:02) (112/55 - 129/82)  BP(mean): --  RR: 17 (2017 09:02) (14 - 17)  SpO2: 98% (2017 09:02) (95% - 100%)  Daily     Daily Weight in k.5 (2017 05:01)    GENERAL:  Appears stated age, well-groomed, well-nourished, no distress  HEENT:  NC/AT,  conjunctivae clear and pink, no thyromegaly, nodules, adenopathy, no JVD, sclera -anicteric  CHEST:  Full & symmetric excursion, no increased effort, breath sounds clear  HEART:  Regular rhythm, S1, S2, no murmur/rub/S3/S4, no abdominal bruit, no edema  ABDOMEN:  Soft, non-tender, non-distended, normoactive bowel sounds,  no masses ,no hepato-splenomegaly, no signs of chronic liver disease  EXTEREMITIES:  no cyanosis,clubbing or edema  SKIN:  No rash/erythema/ecchymoses/petechiae/wounds/abscess/warm/dry  NEURO:  Alert, oriented, no asterixis, no tremor, no encephalopathy    LABS:                        12.1   12.7  )-----------( 493      ( 2017 03:49 )             37.0         141  |  107  |  6<L>  ----------------------------<  87  4.5   |  31  |  1.10    Ca    8.4<L>      2017 03:48  Phos  3.0       Mg     2.2         TPro  7.6  /  Alb  2.3<L>  /  TBili  0.2  /  DBili  x   /  AST  48<H>  /  ALT  99<H>  /  AlkPhos  82      LIVER FUNCTIONS - ( 2017 11:24 )  Alb: 2.3 g/dL / Pro: 7.6 g/dL / ALK PHOS: 82 U/L / ALT: 99 U/L / AST: 48 U/L / GGT: x             Urinalysis Basic - ( 2017 12:56 )    Color: Yellow / Appearance: Clear / S.015 / pH: x  Gluc: x / Ketone: Negative  / Bili: Negative / Urobili: Negative   Blood: x / Protein: Negative / Nitrite: Negative   Leuk Esterase: Negative / RBC: 3-5 /HPF / WBC 3-5   Sq Epi: x / Non Sq Epi: Occasional / Bacteria: Negative      Amylase Serum20      Lipase [id number]       Ammonia--      Imaging: Chief Complaint:  Patient is a 46y old  Male who presents with a chief complaint of Abdominal pain for one month. nausea, vomiting, fever since yesterday (2017 22:54)      HPI: 46 Y.O. M presents to [facility] with 1 month history of worsening abdominal pain. Pt reports pain started spontaneously and is globally about the abdomen and somewhat worse in the right lower quadrant region. Pt reports mild nausea and intermittent greenish diarrhea x past 1 month. Pt denies any history of abdominal surgery in the past but does report episode of colitis ~20 years ago treated with IV antibiotics only and no surgical intervention. Pt reports having a colonoscopy at the time but does not recall having any recent colonoscopy or upper endoscopy. Pt reports having lower grade fevers  and chills for the past few days. Pt reports 1 pisode of blood in his stool yesterday with no recurrence. Pt reports taking xanax and tylenol as needed for pain. Pt reports the symptoms have remained the same but patient decided to see PMD- Dr. Gates and Dr. Alcala today and was sent to ED for further management. Pt underwent CT Abd/Pelvis upon arrival to ED which revealed- Large thick walled collection in the lower abdomen in the midline suggestive of abscess with air-fluid level. A loop of small bowel is draped around this collection however the origin may be from adjacent diverticulitis in the proximal sigmoid colon. Nonobstructive right renal calculus. Thickened and nodular adrenal glands suggests hyperplasia. Mild thickening of the distal esophagus.  Pt is s/p IR drainage and catheter placement of abscess.   Admits to history of ?colitis 20 years ago which he had a colonoscopy for however does not remember the results. Denies history of chronic diarrhea/intermittent abdominal pain/joint pains/rashes. Denies family history of IBD/GI malignancy.     Denies any recent CP, SOB, dysuria.     Allergies:  No Known Allergies      Medications:  acetaminophen   Tablet 650 milliGRAM(s) Oral every 6 hours PRN  lactated ringers. 1000 milliLiter(s) IV Continuous <Continuous>  morphine  - Injectable 2 milliGRAM(s) IV Push every 4 hours PRN  morphine  - Injectable 4 milliGRAM(s) IV Push every 6 hours PRN  nicotine - 21 mG/24Hr(s) Patch 1 patch Transdermal daily PRN  ondansetron Injectable 4 milliGRAM(s) IV Push every 8 hours PRN  pantoprazole  Injectable 40 milliGRAM(s) IV Push daily  piperacillin/tazobactam IVPB. 3.375 Gram(s) IV Intermittent every 8 hours      PMHX/PSHX:  No pertinent past medical history  Arm laceration  S/P shoulder surgery      Family history:  No pertinent family history in first degree relatives      Social History: (+) cocaine, marijuana    ROS:     General:  fevers  Eyes:  Good vision, no reported pain  ENT:  No sore throat, pain, runny nose, dysphagia  CV:  No pain, palpitations, hypo/hypertension  Resp:  No dyspnea, cough, tachypnea, wheezing  GI:  + pain, No nausea, No vomiting, No diarrhea, No constipation, No weight loss, No fever, No pruritis, No rectal bleeding, No tarry stools, No dysphagia,  :  No pain, bleeding, incontinence, nocturia  Muscle:  No pain, weakness  Neuro:  No weakness, tingling, memory problems  Psych:  No fatigue, insomnia, mood problems, depression  Endocrine:  No polyuria, polydipsia, cold/heat intolerance  Heme:  No petechiae, ecchymosis, easy bruisability  Skin:  No rash, tattoos, scars, edema      PHYSICAL EXAM:   Vital Signs:  Vital Signs Last 24 Hrs  T(C): 37.1 (2017 09:02), Max: 37.4 (2017 17:30)  T(F): 98.8 (2017 09:02), Max: 99.4 (2017 17:30)  HR: 74 (2017 09:02) (67 - 99)  BP: 122/73 (2017 09:02) (112/55 - 129/82)  BP(mean): --  RR: 17 (2017 09:02) (14 - 17)  SpO2: 98% (2017 09:02) (95% - 100%)  Daily     Daily Weight in k.5 (2017 05:01)    GENERAL:  Appears stated age, well-groomed, well-nourished, no distress  HEENT:  NC/AT,  conjunctivae clear and pink, no thyromegaly, nodules, adenopathy, no JVD, sclera -anicteric  CHEST:  Full & symmetric excursion, no increased effort, breath sounds clear  HEART:  Regular rhythm, S1, S2, no murmur/rub/S3/S4, no abdominal bruit, no edema  ABDOMEN:  Soft, non-tender, non-distended, normoactive bowel sounds,  no masses ,no hepato-splenomegaly, no signs of chronic liver disease, (+) drain  EXTEREMITIES: +excoriations   SKIN:  No rash/erythema/ecchymoses/petechiae/wounds/abscess/warm/dry  NEURO:  Alert, oriented, no asterixis, no tremor, no encephalopathy    LABS:                        12.1   12.7  )-----------( 493      ( 2017 03:49 )             37.0         141  |  107  |  6<L>  ----------------------------<  87  4.5   |  31  |  1.10    Ca    8.4<L>      2017 03:48  Phos  3.0       Mg     2.2         TPro  7.6  /  Alb  2.3<L>  /  TBili  0.2  /  DBili  x   /  AST  48<H>  /  ALT  99<H>  /  AlkPhos  82      LIVER FUNCTIONS - ( 2017 11:24 )  Alb: 2.3 g/dL / Pro: 7.6 g/dL / ALK PHOS: 82 U/L / ALT: 99 U/L / AST: 48 U/L / GGT: x             Urinalysis Basic - ( 2017 12:56 )    Color: Yellow / Appearance: Clear / S.015 / pH: x  Gluc: x / Ketone: Negative  / Bili: Negative / Urobili: Negative   Blood: x / Protein: Negative / Nitrite: Negative   Leuk Esterase: Negative / RBC: 3-5 /HPF / WBC 3-5   Sq Epi: x / Non Sq Epi: Occasional / Bacteria: Negative      Amylase Serum20      Lipase [id number]       Ammonia--      Imaging:

## 2017-11-30 NOTE — CONSULT NOTE ADULT - PROBLEM SELECTOR RECOMMENDATION 9
Patient s/p drain placement via IR  Pan-cultured  F/U cultures  Continue iv abx  ID/Surgery f/u
etiology of abscess unclear -- ? secondary to diverticulitis/malignancy/possible Crohn's disease  continue IV zosyn, follow up cultures, ID recs  IVF hydration   check ANCA/ASCA   will need eventual endoscopic intervention in 6-8 weeks
lactic acid improved  cont IVF  ABX  cx pending  120 cc drained with IR  surgery following  serial abd exam  pain control  dvt p

## 2017-11-30 NOTE — CONSULT NOTE ADULT - ASSESSMENT
46 M presents to [facility] ED with 1 month history of worsening abdominal pain, with diverticulitis of large intestine with abscess, s/p abscess drainage, with episode of SVT during procedure, now back in SR. This episode of SVT presumably occurred in the setting of stress. There is also the history of recent drug use  Did not require any medications, and converted back to SR without adenosine.    Please repeat EKG now that back in SR.  He should be admitted to telemetry overnight.  Antibiotics  If any more evidence of SVT, and his blood pressure remains stable, start him on lopressor 25mg po bid.  Please send CBC, TSH and urine tox screen  At some point, he should get an echocardiogram to evaluate his LV function.  Watch creatinine and electrolytes. keep K>4, Mg>2  Will follow
Intra-abdominal abscess  DDx as to source is broad, especially as he does not appear to get regular medical care.   Crohn's disease is in the DDx here as well given proximity to small bowel (in addition to large bowel), prior history of unspecified colitis.   Malignancy in DDx. Crohn's disease also RF for malignancy too, though patient is not especially anemic and MCV is normal.  Perforated diverticulitis possible  Small bowel perforation possible too (e.g. foreign body, actinomycosis)    Suggestions--  Continue broad spectrum coverage  F/U Cx  Monitor CBC, Chemistries  GI eval  Will send ASCA's as a screen for Crohn's  Overall plan TBD  Left message for Dr. Tan.    Thank you for the courtesy of this referral.    Jorje Jean MD  [phone]
This is a 46 M comes intraabdominal abscess

## 2017-12-01 LAB
-  AMIKACIN: SIGNIFICANT CHANGE UP
-  AMPICILLIN/SULBACTAM: SIGNIFICANT CHANGE UP
-  AMPICILLIN: SIGNIFICANT CHANGE UP
-  AZTREONAM: SIGNIFICANT CHANGE UP
-  CEFAZOLIN: SIGNIFICANT CHANGE UP
-  CEFEPIME: SIGNIFICANT CHANGE UP
-  CEFOXITIN: SIGNIFICANT CHANGE UP
-  CEFTAZIDIME: SIGNIFICANT CHANGE UP
-  CEFTRIAXONE: SIGNIFICANT CHANGE UP
-  CIPROFLOXACIN: SIGNIFICANT CHANGE UP
-  ERTAPENEM: SIGNIFICANT CHANGE UP
-  GENTAMICIN: SIGNIFICANT CHANGE UP
-  IMIPENEM: SIGNIFICANT CHANGE UP
-  LEVOFLOXACIN: SIGNIFICANT CHANGE UP
-  MEROPENEM: SIGNIFICANT CHANGE UP
-  PIPERACILLIN/TAZOBACTAM: SIGNIFICANT CHANGE UP
-  TOBRAMYCIN: SIGNIFICANT CHANGE UP
-  TRIMETHOPRIM/SULFAMETHOXAZOLE: SIGNIFICANT CHANGE UP
ANION GAP SERPL CALC-SCNC: 5 MMOL/L — SIGNIFICANT CHANGE UP (ref 5–17)
BAKER'S YEAST IGA QN IA: 19.7 UNITS — SIGNIFICANT CHANGE UP
BAKER'S YEAST IGA QN IA: NEGATIVE — SIGNIFICANT CHANGE UP
BAKER'S YEAST IGG QN IA: 12 UNITS — SIGNIFICANT CHANGE UP
BAKER'S YEAST IGG QN IA: NEGATIVE — SIGNIFICANT CHANGE UP
BUN SERPL-MCNC: 10 MG/DL — SIGNIFICANT CHANGE UP (ref 7–23)
CALCIUM SERPL-MCNC: 8.3 MG/DL — LOW (ref 8.5–10.1)
CHLORIDE SERPL-SCNC: 103 MMOL/L — SIGNIFICANT CHANGE UP (ref 96–108)
CO2 SERPL-SCNC: 31 MMOL/L — SIGNIFICANT CHANGE UP (ref 22–31)
CREAT SERPL-MCNC: 1 MG/DL — SIGNIFICANT CHANGE UP (ref 0.5–1.3)
CRP SERPL-MCNC: 3.8 MG/DL — HIGH (ref 0–0.4)
ERYTHROCYTE [SEDIMENTATION RATE] IN BLOOD: 34 MM/HR — HIGH (ref 0–15)
GLUCOSE SERPL-MCNC: 89 MG/DL — SIGNIFICANT CHANGE UP (ref 70–99)
HAV IGG+IGM SER QL: SIGNIFICANT CHANGE UP
HCT VFR BLD CALC: 38.3 % — LOW (ref 39–50)
HGB BLD-MCNC: 12 G/DL — LOW (ref 13–17)
MAGNESIUM SERPL-MCNC: 2.3 MG/DL — SIGNIFICANT CHANGE UP (ref 1.6–2.6)
MCHC RBC-ENTMCNC: 28 PG — SIGNIFICANT CHANGE UP (ref 27–34)
MCHC RBC-ENTMCNC: 31.4 GM/DL — LOW (ref 32–36)
MCV RBC AUTO: 89.1 FL — SIGNIFICANT CHANGE UP (ref 80–100)
METHOD TYPE: SIGNIFICANT CHANGE UP
PLATELET # BLD AUTO: 492 K/UL — HIGH (ref 150–400)
POTASSIUM SERPL-MCNC: 4.3 MMOL/L — SIGNIFICANT CHANGE UP (ref 3.5–5.3)
POTASSIUM SERPL-SCNC: 4.3 MMOL/L — SIGNIFICANT CHANGE UP (ref 3.5–5.3)
PROCALCITONIN SERPL-MCNC: 0.06 NG/ML — HIGH (ref 0–0.04)
RBC # BLD: 4.3 M/UL — SIGNIFICANT CHANGE UP (ref 4.2–5.8)
RBC # FLD: 12.6 % — SIGNIFICANT CHANGE UP (ref 10.3–14.5)
SODIUM SERPL-SCNC: 139 MMOL/L — SIGNIFICANT CHANGE UP (ref 135–145)
WBC # BLD: 12 K/UL — HIGH (ref 3.8–10.5)
WBC # FLD AUTO: 12 K/UL — HIGH (ref 3.8–10.5)

## 2017-12-01 PROCEDURE — 99233 SBSQ HOSP IP/OBS HIGH 50: CPT

## 2017-12-01 RX ADMIN — PANTOPRAZOLE SODIUM 40 MILLIGRAM(S): 20 TABLET, DELAYED RELEASE ORAL at 11:15

## 2017-12-01 RX ADMIN — MORPHINE SULFATE 4 MILLIGRAM(S): 50 CAPSULE, EXTENDED RELEASE ORAL at 21:49

## 2017-12-01 RX ADMIN — MORPHINE SULFATE 4 MILLIGRAM(S): 50 CAPSULE, EXTENDED RELEASE ORAL at 22:08

## 2017-12-01 RX ADMIN — SODIUM CHLORIDE 80 MILLILITER(S): 9 INJECTION, SOLUTION INTRAVENOUS at 21:50

## 2017-12-01 RX ADMIN — PIPERACILLIN AND TAZOBACTAM 25 GRAM(S): 4; .5 INJECTION, POWDER, LYOPHILIZED, FOR SOLUTION INTRAVENOUS at 06:03

## 2017-12-01 RX ADMIN — MORPHINE SULFATE 4 MILLIGRAM(S): 50 CAPSULE, EXTENDED RELEASE ORAL at 06:56

## 2017-12-01 RX ADMIN — MORPHINE SULFATE 4 MILLIGRAM(S): 50 CAPSULE, EXTENDED RELEASE ORAL at 00:14

## 2017-12-01 RX ADMIN — MORPHINE SULFATE 4 MILLIGRAM(S): 50 CAPSULE, EXTENDED RELEASE ORAL at 00:44

## 2017-12-01 RX ADMIN — PIPERACILLIN AND TAZOBACTAM 25 GRAM(S): 4; .5 INJECTION, POWDER, LYOPHILIZED, FOR SOLUTION INTRAVENOUS at 13:40

## 2017-12-01 RX ADMIN — PIPERACILLIN AND TAZOBACTAM 25 GRAM(S): 4; .5 INJECTION, POWDER, LYOPHILIZED, FOR SOLUTION INTRAVENOUS at 21:50

## 2017-12-01 NOTE — PROGRESS NOTE ADULT - SUBJECTIVE AND OBJECTIVE BOX
Date/Time Patient Seen:  		  Referring MD:   Data Reviewed	       Patient is a 46y old  Male who presents with a chief complaint of Abdominal pain for one month. nausea, vomiting, fever since yesterday (29 Nov 2017 22:54)  in bed  seen and examined  vs and meds reviewed    on ABX        Subjective/HPI     PAST MEDICAL & SURGICAL HISTORY:  No pertinent past medical history  Arm laceration: repair of arm laceration 1995- tendon repair  S/P shoulder surgery: left        Medication list         MEDICATIONS  (STANDING):  lactated ringers. 1000 milliLiter(s) (80 mL/Hr) IV Continuous <Continuous>  pantoprazole  Injectable 40 milliGRAM(s) IV Push daily  piperacillin/tazobactam IVPB. 3.375 Gram(s) IV Intermittent every 8 hours    MEDICATIONS  (PRN):  acetaminophen   Tablet 650 milliGRAM(s) Oral every 6 hours PRN For Temp greater than 38 C (100.4 F)  morphine  - Injectable 2 milliGRAM(s) IV Push every 4 hours PRN Moderate Pain (4 - 6)  morphine  - Injectable 4 milliGRAM(s) IV Push every 6 hours PRN Severe Pain (7 - 10)  nicotine - 21 mG/24Hr(s) Patch 1 patch Transdermal daily PRN nicotine w/d sx  ondansetron Injectable 4 milliGRAM(s) IV Push every 8 hours PRN Nausea and/or Vomiting         Vitals log        ICU Vital Signs Last 24 Hrs  T(C): 36.7 (01 Dec 2017 05:12), Max: 37.4 (30 Nov 2017 13:13)  T(F): 98 (01 Dec 2017 05:12), Max: 99.4 (30 Nov 2017 13:13)  HR: 77 (01 Dec 2017 05:12) (74 - 81)  BP: 115/73 (01 Dec 2017 05:12) (115/73 - 125/78)  BP(mean): --  ABP: --  ABP(mean): --  RR: 17 (01 Dec 2017 05:12) (17 - 17)  SpO2: 98% (01 Dec 2017 05:12) (96% - 98%)           Input and Output:  I&O's Detail    29 Nov 2017 07:01  -  30 Nov 2017 07:00  --------------------------------------------------------  IN:    lactated ringers.: 640 mL  Total IN: 640 mL    OUT:    Drain: 240 mL    Voided: 500 mL  Total OUT: 740 mL    Total NET: -100 mL      30 Nov 2017 07:01  -  01 Dec 2017 06:06  --------------------------------------------------------  IN:    lactated ringers.: 960 mL    Solution: 100 mL  Total IN: 1060 mL    OUT:    Drain: 10 mL  Total OUT: 10 mL    Total NET: 1050 mL          Lab Data                        12.1   12.7  )-----------( 493      ( 30 Nov 2017 03:49 )             37.0     11-30    141  |  107  |  6<L>  ----------------------------<  87  4.5   |  31  |  1.10    Ca    8.4<L>      30 Nov 2017 03:48  Phos  3.0     11-30  Mg     2.2     11-30    TPro  7.6  /  Alb  2.3<L>  /  TBili  0.2  /  DBili  x   /  AST  48<H>  /  ALT  99<H>  /  AlkPhos  82  11-29            Review of Systems	      Objective     Physical Examination    head at  heart - s1s2  lungs - dec BS  abd - soft  drain in    Pertinent Lab findings & Imaging      Elvie:  NO   Adequate UO     I&O's Detail    29 Nov 2017 07:01  -  30 Nov 2017 07:00  --------------------------------------------------------  IN:    lactated ringers.: 640 mL  Total IN: 640 mL    OUT:    Drain: 240 mL    Voided: 500 mL  Total OUT: 740 mL    Total NET: -100 mL      30 Nov 2017 07:01  -  01 Dec 2017 06:06  --------------------------------------------------------  IN:    lactated ringers.: 960 mL    Solution: 100 mL  Total IN: 1060 mL    OUT:    Drain: 10 mL  Total OUT: 10 mL    Total NET: 1050 mL               Discussed with:     Cultures:	        Radiology

## 2017-12-01 NOTE — PROGRESS NOTE ADULT - ASSESSMENT
Intra-abdominal abscess  DDx as to source is broad, especially as he does not appear to get regular medical care.   Crohn's disease is in the DDx here as well given proximity to small bowel (in addition to large bowel), prior history of unspecified colitis.   Malignancy in DDx. Crohn's disease also RF for malignancy too, though patient is not especially anemic and MCV is normal.  Perforated diverticulitis possible  Small bowel perforation possible too (e.g. foreign body, actinomycosis)  Symptomatically improved with drainage  HCV Ab +, PCR pending

## 2017-12-01 NOTE — PROVIDER CONTACT NOTE (OTHER) - ACTION/TREATMENT ORDERED:
Per Dr. Osei he saw pt today and is aware of results and labs and observed lethargy. Keep an eye pt no other new orders at this time.
Left message on service awaiting call back
Per Dr Castillo Low residue diet for pt as telephone order
Per Dr Jo fournier  to D/C Remote

## 2017-12-01 NOTE — PROGRESS NOTE ADULT - SUBJECTIVE AND OBJECTIVE BOX
Patient is a 46y old  Male who presents with a chief complaint of Abdominal pain for one month. nausea, vomiting, fever since yesterday (29 Nov 2017 22:54)      INTERVAL HPI/OVERNIGHT EVENTS:        acetaminophen   Tablet 650 milliGRAM(s) Oral every 6 hours PRN  lactated ringers. 1000 milliLiter(s) IV Continuous <Continuous>  morphine  - Injectable 2 milliGRAM(s) IV Push every 4 hours PRN  morphine  - Injectable 4 milliGRAM(s) IV Push every 6 hours PRN  nicotine - 21 mG/24Hr(s) Patch 1 patch Transdermal daily PRN  ondansetron Injectable 4 milliGRAM(s) IV Push every 8 hours PRN  pantoprazole  Injectable 40 milliGRAM(s) IV Push daily  piperacillin/tazobactam IVPB. 3.375 Gram(s) IV Intermittent every 8 hours      REVIEW OF SYSTEMS:  CONSTITUTIONAL: No fever, no weight loss, or no fatigue  NECK: No pain, no stiffness  RESPIRATORY: No cough, no wheezing, no chills, no hemoptysis, No shortness of breath  CARDIOVASCULAR: No chest pain, no palpitations, no dizziness, no leg swelling  GASTROINTESTINAL: No abdominal pain. No nausea, no vomiting, no hematemesis; No diarrhea, no constipation. No melena, no hematochezia.  GENITOURINARY: No dysuria, no frequency, no hematuria, no incontinence  NEUROLOGICAL: No headaches, no loss of strength, no numbness, no tremors  SKIN: No itching, no burning  MUSCULOSKELETAL: No joint pain, no swelling; No muscle, no back, no extremity pain  PSYCHIATRIC: No depression, no mood swings,   HEME/LYMPH: No easy bruising, no bleeding gums  ALLERY AND IMMUNOLOGIC: No hives       Consultant(s) Notes Reviewed:  [ ] YES  [ ] NO    PHYSICAL EXAM:  GENERAL: NAD  HEAD:  Atraumatic, Normocephalic  EYES: EOMI, PERRLA, conjunctiva and sclera clear  ENMT: No tonsillar erythema, exudates, or enlargement; Moist mucous membranes  NECK: Supple, No JVD  NERVOUS SYSTEM:  Awake & alert  CHEST/LUNG: Clear to auscultation bilaterally; No rales, rhonchi, wheezing,  HEART: Regular rate and rhythm  ABDOMEN: Soft, Nontender, Nondistended; Bowel sounds present  EXTREMITIES:  No clubbing, cyanosis, or edema  LYMPH: No lymphadenopathy noted  SKIN: No rashes      Advanced care planning discussed with patient/family [ ] YES   [ ] NO

## 2017-12-01 NOTE — PROGRESS NOTE ADULT - ASSESSMENT
46 M presents to [place] ED with 1 month history of worsening abdominal pain, with diverticulitis of large intestine with abscess, s/p abscess drainage, with episode of SVT during procedure, now back in SR. This episode of SVT presumably occurred in the setting of stress. There is also the history of recent drug use  Did not require any medications, and converted back to SR without adenosine.    - Maintaining SR  - Hold off on AVN blockers  - can remain off telemetry   - outpatient echo  - antibiotics per id  - abscess workup per id and gi  - Monitor and replete electrolytes. Keep K>4.0 and Mg>2.0.  - Further cardiac workup will depend on clinical course.   - All other workup per primary team. Will followup.

## 2017-12-01 NOTE — PROGRESS NOTE ADULT - SUBJECTIVE AND OBJECTIVE BOX
INTERVAL HPI/OVERNIGHT EVENTS:  denies abd pain/diarrhea  feels better    HPI:  46 Y.O. M presents to [place] ED with 1 month history of worsening abdominal pain. Pt reports pain started spontaneously and is globally about the abdomen and somewhat worse in the right lower quadrant region. Pt reports mild nausea and intermittent greenish diarrhea x past 1 month. Pt denies any history of abdominal surgery in the past but does report episode of colitis ~20 years ago treated with IV antibiotics only and no surgical intervention. Pt reports having a colonoscopy at the time but does not recall having any recent colonoscopy or upper endoscopy. Pt reports having lower grade fevers  and chills for the past few days. Pt reports 1 pisode of blood in his stool yesterday with no recurrence. Pt reports taking xanax and tylenol as needed for pain. Pt reports the symptoms have remained the same but patient decided to see PMD- Dr. Gates and Dr. Alcala today and was sent to ED for further management. Pt underwent CT Abd/Pelvis upon arrival to ED which revealed- Large thick walled collection in the lower abdomen in the midline suggestive of abscess with air-fluid level. A loop of small bowel is draped around this collection however the origin may be from adjacent diverticulitis in the proximal sigmoid colon. Nonobstructive right renal calculus. Thickened and nodular adrenal glands suggests hyperplasia. Mild thickening of the distal esophagus.    Denies any recent CP, SOB, dysuria. (29 Nov 2017 16:17)    MEDICATIONS  (STANDING):  lactated ringers. 1000 milliLiter(s) (80 mL/Hr) IV Continuous <Continuous>  pantoprazole  Injectable 40 milliGRAM(s) IV Push daily  piperacillin/tazobactam IVPB. 3.375 Gram(s) IV Intermittent every 8 hours    MEDICATIONS  (PRN):  acetaminophen   Tablet 650 milliGRAM(s) Oral every 6 hours PRN For Temp greater than 38 C (100.4 F)  morphine  - Injectable 2 milliGRAM(s) IV Push every 4 hours PRN Moderate Pain (4 - 6)  morphine  - Injectable 4 milliGRAM(s) IV Push every 6 hours PRN Severe Pain (7 - 10)  nicotine - 21 mG/24Hr(s) Patch 1 patch Transdermal daily PRN nicotine w/d sx  ondansetron Injectable 4 milliGRAM(s) IV Push every 8 hours PRN Nausea and/or Vomiting      Allergies    No Known Allergies    Intolerances          General:  No wt loss, fevers, chills, night sweats, fatigue,   Eyes:  Good vision, no reported pain  ENT:  No sore throat, pain, runny nose, dysphagia  CV:  No pain, palpitations, hypo/hypertension  Resp:  No dyspnea, cough, tachypnea, wheezing  GI:  No pain, No nausea, No vomiting, No diarrhea, No constipation, No weight loss, No fever, No pruritis, No rectal bleeding, No tarry stools, No dysphagia,  :  No pain, bleeding, incontinence, nocturia  Muscle:  No pain, weakness  Neuro:  No weakness, tingling, memory problems  Psych:  No fatigue, insomnia, mood problems, depression  Endocrine:  No polyuria, polydipsia, cold/heat intolerance  Heme:  No petechiae, ecchymosis, easy bruisability  Skin:  No rash, tattoos, scars, edema      PHYSICAL EXAM:   Vital Signs:  Vital Signs Last 24 Hrs  T(C): 36.7 (01 Dec 2017 13:13), Max: 36.8 (30 Nov 2017 21:06)  T(F): 98 (01 Dec 2017 13:13), Max: 98.3 (30 Nov 2017 21:06)  HR: 69 (01 Dec 2017 13:13) (69 - 77)  BP: 126/79 (01 Dec 2017 13:13) (115/73 - 126/79)  BP(mean): --  RR: 18 (01 Dec 2017 13:13) (17 - 18)  SpO2: 97% (01 Dec 2017 13:13) (96% - 98%)  Daily     Daily I&O's Summary    30 Nov 2017 07:01  -  01 Dec 2017 07:00  --------------------------------------------------------  IN: 2020 mL / OUT: 15 mL / NET: 2005 mL        GENERAL:  Appears stated age, well-groomed, well-nourished, no distress  HEENT:  NC/AT,  conjunctivae clear and pink, no thyromegaly, nodules, adenopathy, no JVD, sclera -anicteric  CHEST:  Full & symmetric excursion, no increased effort, breath sounds clear  HEART:  Regular rhythm, S1, S2, no murmur/rub/S3/S4, no abdominal bruit, no edema  ABDOMEN:  Soft, non-tender, non-distended, normoactive bowel sounds,  no masses ,no hepato-splenomegaly, no signs of chronic liver disease, (+) abscess drain  EXTEREMITIES:  no cyanosis,clubbing or edema  SKIN:  No rash/erythema/ecchymoses/petechiae/wounds/abscess/warm/dry  NEURO:  Alert, oriented, no asterixis, no tremor, no encephalopathy      LABS:                        12.0   12.0  )-----------( 492      ( 01 Dec 2017 07:09 )             38.3     12-01    139  |  103  |  10  ----------------------------<  89  4.3   |  31  |  1.00    Ca    8.3<L>      01 Dec 2017 07:09  Phos  3.0     11-30  Mg     2.3     12-01    TPro  6.7  /  Alb  2.2<L>  /  TBili  0.3  /  DBili  <.10  /  AST  30  /  ALT  66  /  AlkPhos  64  12-01        amylaseAmylase, Serum Total: 20 U/L (11-29 @ 11:24)     lipaseLipase, Serum: 104 U/L (11-29 @ 11:24)    RADIOLOGY & ADDITIONAL TESTS:

## 2017-12-01 NOTE — PROVIDER CONTACT NOTE (OTHER) - REASON
Pt diet
Pt urine results and possible consults
Should Remote Tele  be renewed Pt NSR in the 70's
results from urine and pt is more lethargic than prior day

## 2017-12-01 NOTE — PROGRESS NOTE ADULT - SUBJECTIVE AND OBJECTIVE BOX
[facility], Division of Infectious Diseases  EMANUEL Prado A. Lee    Name: UCHE BE  Age: 46y  Gender: Male  MRN: [id number]    Interval History--  Notes reviewed. Discussed with Dr. Tan earlier. For repeat imaging Monday-Tuesday. Pain better. Sweats at times, no fevers/chills.     Past Medical History--  No pertinent past medical history  Arm laceration  S/P shoulder surgery      For details regarding the patient's social history, family history, and other miscellaneous elements, please refer the initial infectious diseases consultation and/or the admitting history and physical examination for this admission.    Allergies    No Known Allergies    Intolerances        Medications--  Antibiotics:  piperacillin/tazobactam IVPB. 3.375 Gram(s) IV Intermittent every 8 hours    Immunologic:    Other:  acetaminophen   Tablet PRN  lactated ringers.  morphine  - Injectable PRN  morphine  - Injectable PRN  nicotine - 21 mG/24Hr(s) Patch PRN  ondansetron Injectable PRN  pantoprazole  Injectable      Review of Systems--  A 10-point review of systems was obtained.   Review of systems otherwise unchanged compared to prior visit except as previously noted.    Physical Examination--  Vital Signs: T(F): 98 (12-01-17 @ 13:13), Max: 98.3 (11-30-17 @ 21:06)  HR: 69 (12-01-17 @ 13:13)  BP: 126/79 (12-01-17 @ 13:13)  RR: 18 (12-01-17 @ 13:13)  SpO2: 97% (12-01-17 @ 13:13)  Wt(kg): --  General: Nontoxic-appearing Male in no acute distress.  HEENT: AT/NC. Anicteric. Conjunctiva pink and moist. Oropharynx clear.  Neck: Not rigid. No sense of mass.  Nodes: None palpable.  Lungs: Clear bilaterally without rales, wheezing or rhonchi  Heart: Regular rate and rhythm. No Murmur. No rub. No gallop. No palpable thrill.  Abdomen: Bowel sounds present and normoactive. Soft. Nondistended. Nontender. No sense of mass. No organomegaly. Drain -> taupe purulent fluid.  Extremities: No cyanosis or clubbing. No edema.   Skin: Warm. Dry. Good turgor. No rash. No vasculitic stigmata. Scarring RUE from prior surgery and also rounded areas which patient staets are related to work/molten metal burn injury. No tract marks   Psychiatric: Appropriate affect and mood for situation.       Laboratory Studies--  CBC                        12.0   12.0  )-----------( 492      ( 01 Dec 2017 07:09 )             38.3       Chemistries  12-01    139  |  103  |  10  ----------------------------<  89  4.3   |  31  |  1.00    Ca    8.3<L>      01 Dec 2017 07:09  Phos  3.0     11-30  Mg     2.3     12-01    TPro  6.7  /  Alb  2.2<L>  /  TBili  0.3  /  DBili  <.10  /  AST  30  /  ALT  66  /  AlkPhos  64  12-01      Culture Data  Culture - Body Fluid with Gram Stain (11.29.17 @ 21:24)    Gram Stain:   Numerous polymorphonuclear leukocytes per low power field  Numerous Gram Positive Cocci in Pairs and Chains per oil power field  Numerous Gram Negative Rods per oil power field    Specimen Source: Abdominal Fl Abdominal Fluid    Culture Results:   Numerous Escherichia coli  Numerous Streptococcus anginosus    Culture - Urine (11.29.17 @ 18:38)    Specimen Source: .Urine Clean Catch (Midstream)    Culture Results:   No growth    Culture - Blood (11.29.17 @ 18:34)    Specimen Source: .Blood Blood-Venous    Culture Results:   No growth to date.    Culture - Blood (11.29.17 @ 18:34)    Specimen Source: .Blood Blood-Venous    Culture Results:   No growth to date.

## 2017-12-01 NOTE — PROGRESS NOTE ADULT - ATTENDING COMMENTS
Call if ID input needed over the weekend, Dr. Kip Felipe is on call.    Jorje Jean MD  [phone] Call if ID input needed over the weekend, Dr. Rickie Escobedo is on call.    Jorje Jean MD  [phone]

## 2017-12-01 NOTE — PROGRESS NOTE ADULT - SUBJECTIVE AND OBJECTIVE BOX
[facility]    Joyce Wade, Alice, Michaela, Aaron, Matti, Frederick      [phone]    CHIEF COMPLAINT: Patient is a 46y old  Male who presents with a chief complaint of Abdominal pain for one month. nausea, vomiting, fever since yesterday (29 Nov 2017 22:54)      Follow Up: svt, s/p abscess drainage    Interim history: The patient reports no new symptoms.  Denies chest discomfort and shortness of breath.  unchanged abdominal pain.  No new neurologic symptoms.      MEDICATIONS  (STANDING):  lactated ringers. 1000 milliLiter(s) (80 mL/Hr) IV Continuous <Continuous>  pantoprazole  Injectable 40 milliGRAM(s) IV Push daily  piperacillin/tazobactam IVPB. 3.375 Gram(s) IV Intermittent every 8 hours    MEDICATIONS  (PRN):  acetaminophen   Tablet 650 milliGRAM(s) Oral every 6 hours PRN For Temp greater than 38 C (100.4 F)  morphine  - Injectable 2 milliGRAM(s) IV Push every 4 hours PRN Moderate Pain (4 - 6)  morphine  - Injectable 4 milliGRAM(s) IV Push every 6 hours PRN Severe Pain (7 - 10)  nicotine - 21 mG/24Hr(s) Patch 1 patch Transdermal daily PRN nicotine w/d sx  ondansetron Injectable 4 milliGRAM(s) IV Push every 8 hours PRN Nausea and/or Vomiting      REVIEW OF SYSTEMS:  eye, ent, GI, , allergic, dermatologic, musculoskeletal and neurologic are negative except as described above    Vital Signs Last 24 Hrs  T(C): 36.7 (01 Dec 2017 13:13), Max: 36.8 (30 Nov 2017 21:06)  T(F): 98 (01 Dec 2017 13:13), Max: 98.3 (30 Nov 2017 21:06)  HR: 69 (01 Dec 2017 13:13) (69 - 77)  BP: 126/79 (01 Dec 2017 13:13) (115/73 - 126/79)  BP(mean): --  RR: 18 (01 Dec 2017 13:13) (17 - 18)  SpO2: 97% (01 Dec 2017 13:13) (96% - 98%)    I&O's Summary    30 Nov 2017 07:01  -  01 Dec 2017 07:00  --------------------------------------------------------  IN: 2020 mL / OUT: 15 mL / NET: 2005 mL        Telemetry past 24h:    PHYSICAL EXAM:    Constitutional: well-nourished, well-developed, NAD   HEENT:  MMM, sclerae anicteric, conjunctivae clear, no oral cyanosis.  Pulmonary: Non-labored, breath sounds are clear bilaterally, No wheezing, rales or rhonchi  Cardiovascular: Regular, S1 and S2.  No murmur.  No rubs, gallops or clicks  Gastrointestinal: Bowel Sounds present, soft, tender no change.   Lymph: No peripheral edema.   Neurological: Alert, no focal deficits  Skin: No rashes.  Psych:  Mood & affect appropriate    LABS: All Labs Reviewed:                        12.0   12.0  )-----------( 492      ( 01 Dec 2017 07:09 )             38.3                         12.1   12.7  )-----------( 493      ( 30 Nov 2017 03:49 )             37.0                         11.2   17.5  )-----------( 830      ( 29 Nov 2017 19:49 )             35.1     01 Dec 2017 07:09    139    |  103    |  10     ----------------------------<  89     4.3     |  31     |  1.00   30 Nov 2017 03:48    141    |  107    |  6      ----------------------------<  87     4.5     |  31     |  1.10   29 Nov 2017 19:49    141    |  107    |  8      ----------------------------<  91     4.0     |  28     |  1.20     Ca    8.3        01 Dec 2017 07:09  Ca    8.4        30 Nov 2017 03:48  Ca    7.3        29 Nov 2017 19:49  Phos  3.0       30 Nov 2017 03:48  Mg     2.3       01 Dec 2017 07:09  Mg     2.2       30 Nov 2017 03:48  Mg     1.9       29 Nov 2017 19:49    TPro  6.7    /  Alb  2.2    /  TBili  0.3    /  DBili  <.10   /  AST  30     /  ALT  66     /  AlkPhos  64     01 Dec 2017 07:09  TPro  7.6    /  Alb  2.3    /  TBili  0.2    /  DBili  x      /  AST  48     /  ALT  99     /  AlkPhos  82     29 Nov 2017 11:24          Blood Culture: Organism --  Gram Stain Blood -- Gram Stain   Numerous polymorphonuclear leukocytes per low power field  Numerous Gram Positive Cocci in Pairs and Chains per oil power field  Numerous Gram Negative Rods per oil power field  Specimen Source Abdominal Fl Abdominal Fluid  Culture-Blood --    Organism --  Gram Stain Blood -- Gram Stain --  Specimen Source .Urine Clean Catch (Midstream)  Culture-Blood --    Organism --  Gram Stain Blood -- Gram Stain --  Specimen Source .Blood Blood-Venous  Culture-Blood --        11-29 @ 19:49  TSH: 1.44      RADIOLOGY:    EKG:    Echo:

## 2017-12-01 NOTE — PROGRESS NOTE ADULT - ATTENDING COMMENTS
Pt seen examined and discussed plan of Rx  1/continue IV atbx until C&S available  2/Tube check on Monday  3/GI fu and eventual partial colectomy in about 6 weeks

## 2017-12-01 NOTE — PROGRESS NOTE ADULT - SUBJECTIVE AND OBJECTIVE BOX
UCHE BE  MRN-[id number] 46y    GENERAL SURGERY/ DR. BEDOLLA    Vital Signs Last 24 Hrs  T(C): 36.7 (01 Dec 2017 05:12), Max: 37.4 (30 Nov 2017 13:13)  T(F): 98 (01 Dec 2017 05:12), Max: 99.4 (30 Nov 2017 13:13)  HR: 77 (01 Dec 2017 05:12) (74 - 81)  BP: 115/73 (01 Dec 2017 05:12) (115/73 - 125/78)  BP(mean): --  RR: 17 (01 Dec 2017 05:12) (17 - 17)  SpO2: 98% (01 Dec 2017 05:12) (96% - 98%)  DRAIN 10 CC/ 24 HOURS      LUNGS:         CLEAR TO AUSCULTATION , NO W/R/R  ABDOMEN    CATH IN PLACE , SOME HARDNESS ABOVE THE CATH , REST OF THE ABDOMEN SOFT AND NON-TENDER                            12.1   12.7  )-----------( 493      ( 30 Nov 2017 03:49 )             37.0      11-30    141  |  107  |  6<L>  ----------------------------<  87  4.5   |  31  |  1.10    Ca    8.4<L>      30 Nov 2017 03:48  Phos  3.0     11-30  Mg     2.2     11-30    TPro  7.6  /  Alb  2.3<L>  /  TBili  0.2  /  DBili  x   /  AST  48<H>  /  ALT  99<H>  /  AlkPhos  82  11-29    Culture - Body Fluid with Gram Stain (11.29.17 @ 21:24)    Gram Stain:   Numerous polymorphonuclear leukocytes per low power field  Numerous Gram Positive Cocci in Pairs and Chains per oil power field  Numerous Gram Negative Rods per oil power field    Specimen Source: Abdominal Fl Abdominal Fluid    Culture Results:   Numerous Escherichia coli  Numerous Streptococcus anginosus                             ASSESSMENT &  PLAN: PELVIC ABSCESS    ON ZOSYN   TOLERATING REGULAR DIET   GI. MEDICAL AND ID CONSULT AND F/U NOTED UCHE BE  MRN-[id number] 46y    GENERAL SURGERY/ DR. BEDOLLA    Vital Signs Last 24 Hrs  T(C): 36.7 (01 Dec 2017 05:12), Max: 37.4 (30 Nov 2017 13:13)  T(F): 98 (01 Dec 2017 05:12), Max: 99.4 (30 Nov 2017 13:13)  HR: 77 (01 Dec 2017 05:12) (74 - 81)  BP: 115/73 (01 Dec 2017 05:12) (115/73 - 125/78)  BP(mean): --  RR: 17 (01 Dec 2017 05:12) (17 - 17)  SpO2: 98% (01 Dec 2017 05:12) (96% - 98%)  DRAIN 10 CC/ 24 HOURS      LUNGS:         CLEAR TO AUSCULTATION , NO W/R/R  ABDOMEN    CATH IN PLACE , SOME HARDNESS ABOVE THE CATH , REST OF THE ABDOMEN SOFT AND NON-TENDER                            12.1   12.7  )-----------( 493      ( 30 Nov 2017 03:49 )             37.0      11-30    141  |  107  |  6<L>  ----------------------------<  87  4.5   |  31  |  1.10    Ca    8.4<L>      30 Nov 2017 03:48  Phos  3.0     11-30  Mg     2.2     11-30    TPro  7.6  /  Alb  2.3<L>  /  TBili  0.2  /  DBili  x   /  AST  48<H>  /  ALT  99<H>  /  AlkPhos  82  11-29    Culture - Body Fluid with Gram Stain (11.29.17 @ 21:24)    Gram Stain:   Numerous polymorphonuclear leukocytes per low power field  Numerous Gram Positive Cocci in Pairs and Chains per oil power field  Numerous Gram Negative Rods per oil power field    Specimen Source: Abdominal Fl Abdominal Fluid    Culture Results:   Numerous Escherichia coli  Numerous Streptococcus anginosus                             ASSESSMENT &  PLAN: PELVIC ABSCESS    ON ZOSYN   TOLERATING REGULAR DIET   GI. MEDICAL, PULMONARY/ CRITICAL CARE AND ID CONSULT AND F/U NOTED

## 2017-12-02 LAB
ALBUMIN SERPL ELPH-MCNC: 2.2 G/DL — LOW (ref 3.3–5)
ALP SERPL-CCNC: 67 U/L — SIGNIFICANT CHANGE UP (ref 40–120)
ALT FLD-CCNC: 66 U/L — SIGNIFICANT CHANGE UP (ref 12–78)
ANION GAP SERPL CALC-SCNC: 8 MMOL/L — SIGNIFICANT CHANGE UP (ref 5–17)
AST SERPL-CCNC: 29 U/L — SIGNIFICANT CHANGE UP (ref 15–37)
BILIRUB SERPL-MCNC: 0.2 MG/DL — SIGNIFICANT CHANGE UP (ref 0.2–1.2)
BUN SERPL-MCNC: 8 MG/DL — SIGNIFICANT CHANGE UP (ref 7–23)
CALCIUM SERPL-MCNC: 8.1 MG/DL — LOW (ref 8.5–10.1)
CHLORIDE SERPL-SCNC: 106 MMOL/L — SIGNIFICANT CHANGE UP (ref 96–108)
CO2 SERPL-SCNC: 29 MMOL/L — SIGNIFICANT CHANGE UP (ref 22–31)
CREAT SERPL-MCNC: 0.98 MG/DL — SIGNIFICANT CHANGE UP (ref 0.5–1.3)
GLUCOSE SERPL-MCNC: 80 MG/DL — SIGNIFICANT CHANGE UP (ref 70–99)
HCT VFR BLD CALC: 38.1 % — LOW (ref 39–50)
HGB BLD-MCNC: 12.2 G/DL — LOW (ref 13–17)
MAGNESIUM SERPL-MCNC: 2.2 MG/DL — SIGNIFICANT CHANGE UP (ref 1.6–2.6)
MCHC RBC-ENTMCNC: 28.7 PG — SIGNIFICANT CHANGE UP (ref 27–34)
MCHC RBC-ENTMCNC: 31.9 GM/DL — LOW (ref 32–36)
MCV RBC AUTO: 90.1 FL — SIGNIFICANT CHANGE UP (ref 80–100)
PLATELET # BLD AUTO: 516 K/UL — HIGH (ref 150–400)
POTASSIUM SERPL-MCNC: 3.8 MMOL/L — SIGNIFICANT CHANGE UP (ref 3.5–5.3)
POTASSIUM SERPL-SCNC: 3.8 MMOL/L — SIGNIFICANT CHANGE UP (ref 3.5–5.3)
PROT SERPL-MCNC: 6.8 G/DL — SIGNIFICANT CHANGE UP (ref 6–8.3)
RBC # BLD: 4.23 M/UL — SIGNIFICANT CHANGE UP (ref 4.2–5.8)
RBC # FLD: 12.3 % — SIGNIFICANT CHANGE UP (ref 10.3–14.5)
SODIUM SERPL-SCNC: 143 MMOL/L — SIGNIFICANT CHANGE UP (ref 135–145)
WBC # BLD: 11.2 K/UL — HIGH (ref 3.8–10.5)
WBC # FLD AUTO: 11.2 K/UL — HIGH (ref 3.8–10.5)

## 2017-12-02 PROCEDURE — 99233 SBSQ HOSP IP/OBS HIGH 50: CPT

## 2017-12-02 RX ADMIN — PIPERACILLIN AND TAZOBACTAM 25 GRAM(S): 4; .5 INJECTION, POWDER, LYOPHILIZED, FOR SOLUTION INTRAVENOUS at 14:16

## 2017-12-02 RX ADMIN — MORPHINE SULFATE 2 MILLIGRAM(S): 50 CAPSULE, EXTENDED RELEASE ORAL at 02:20

## 2017-12-02 RX ADMIN — PIPERACILLIN AND TAZOBACTAM 25 GRAM(S): 4; .5 INJECTION, POWDER, LYOPHILIZED, FOR SOLUTION INTRAVENOUS at 21:53

## 2017-12-02 RX ADMIN — MORPHINE SULFATE 4 MILLIGRAM(S): 50 CAPSULE, EXTENDED RELEASE ORAL at 22:04

## 2017-12-02 RX ADMIN — MORPHINE SULFATE 4 MILLIGRAM(S): 50 CAPSULE, EXTENDED RELEASE ORAL at 14:27

## 2017-12-02 RX ADMIN — PIPERACILLIN AND TAZOBACTAM 25 GRAM(S): 4; .5 INJECTION, POWDER, LYOPHILIZED, FOR SOLUTION INTRAVENOUS at 06:34

## 2017-12-02 RX ADMIN — PANTOPRAZOLE SODIUM 40 MILLIGRAM(S): 20 TABLET, DELAYED RELEASE ORAL at 14:16

## 2017-12-02 RX ADMIN — MORPHINE SULFATE 4 MILLIGRAM(S): 50 CAPSULE, EXTENDED RELEASE ORAL at 06:54

## 2017-12-02 RX ADMIN — MORPHINE SULFATE 4 MILLIGRAM(S): 50 CAPSULE, EXTENDED RELEASE ORAL at 22:34

## 2017-12-02 RX ADMIN — MORPHINE SULFATE 2 MILLIGRAM(S): 50 CAPSULE, EXTENDED RELEASE ORAL at 01:50

## 2017-12-02 RX ADMIN — MORPHINE SULFATE 4 MILLIGRAM(S): 50 CAPSULE, EXTENDED RELEASE ORAL at 07:46

## 2017-12-02 RX ADMIN — MORPHINE SULFATE 4 MILLIGRAM(S): 50 CAPSULE, EXTENDED RELEASE ORAL at 15:00

## 2017-12-02 NOTE — PROGRESS NOTE ADULT - SUBJECTIVE AND OBJECTIVE BOX
Date/Time Patient Seen:  		  Referring MD:   Data Reviewed	       Patient is a 46y old  Male who presents with a chief complaint of Abdominal pain for one month. nausea, vomiting, fever since yesterday (29 Nov 2017 22:54)  in bed  seen and examined  vs and meds reviewed        Subjective/HPI     PAST MEDICAL & SURGICAL HISTORY:  No pertinent past medical history  Arm laceration: repair of arm laceration 1995- tendon repair  S/P shoulder surgery: left        Medication list         MEDICATIONS  (STANDING):  lactated ringers. 1000 milliLiter(s) (50 mL/Hr) IV Continuous <Continuous>  pantoprazole  Injectable 40 milliGRAM(s) IV Push daily  piperacillin/tazobactam IVPB. 3.375 Gram(s) IV Intermittent every 8 hours    MEDICATIONS  (PRN):  acetaminophen   Tablet 650 milliGRAM(s) Oral every 6 hours PRN For Temp greater than 38 C (100.4 F)  morphine  - Injectable 2 milliGRAM(s) IV Push every 4 hours PRN Moderate Pain (4 - 6)  morphine  - Injectable 4 milliGRAM(s) IV Push every 6 hours PRN Severe Pain (7 - 10)  nicotine - 21 mG/24Hr(s) Patch 1 patch Transdermal daily PRN nicotine w/d sx  ondansetron Injectable 4 milliGRAM(s) IV Push every 8 hours PRN Nausea and/or Vomiting         Vitals log        ICU Vital Signs Last 24 Hrs  T(C): 36.3 (02 Dec 2017 05:29), Max: 36.7 (01 Dec 2017 13:13)  T(F): 97.3 (02 Dec 2017 05:29), Max: 98 (01 Dec 2017 13:13)  HR: 70 (02 Dec 2017 05:29) (67 - 70)  BP: 118/70 (02 Dec 2017 05:29) (118/70 - 126/79)  BP(mean): --  ABP: --  ABP(mean): --  RR: 16 (02 Dec 2017 05:29) (16 - 18)  SpO2: 96% (02 Dec 2017 05:29) (96% - 97%)           Input and Output:  I&O's Detail    30 Nov 2017 07:01  -  01 Dec 2017 07:00  --------------------------------------------------------  IN:    lactated ringers.: 1920 mL    Solution: 100 mL  Total IN: 2020 mL    OUT:    Drain: 15 mL  Total OUT: 15 mL    Total NET: 2005 mL          Lab Data                        12.0   12.0  )-----------( 492      ( 01 Dec 2017 07:09 )             38.3     12-01    139  |  103  |  10  ----------------------------<  89  4.3   |  31  |  1.00    Ca    8.3<L>      01 Dec 2017 07:09  Mg     2.3     12-01    TPro  6.7  /  Alb  2.2<L>  /  TBili  0.3  /  DBili  <.10  /  AST  30  /  ALT  66  /  AlkPhos  64  12-01            Review of Systems	      Objective     Physical Examination    head at  heart s1s2  lungs dec BS  abd BS noted      Pertinent Lab findings & Imaging      Elvie:  NO   Adequate UO     I&O's Detail    30 Nov 2017 07:01  -  01 Dec 2017 07:00  --------------------------------------------------------  IN:    lactated ringers.: 1920 mL    Solution: 100 mL  Total IN: 2020 mL    OUT:    Drain: 15 mL  Total OUT: 15 mL    Total NET: 2005 mL               Discussed with:     Cultures:	        Radiology

## 2017-12-02 NOTE — PROGRESS NOTE ADULT - ATTENDING COMMENTS
Pt doing well, on atbx. Monday going for a tube check, if improved will consider switching to oral atbx and discharge to home

## 2017-12-02 NOTE — PROGRESS NOTE ADULT - ASSESSMENT
46 M presents to [place] ED with 1 month history of worsening abdominal pain, with diverticulitis of large intestine with abscess, s/p abscess drainage, with episode of SVT during procedure, now back in SR. This episode of SVT presumably occurred in the setting of stress. There is also the history of recent drug use  Did not require any medications, and converted back to SR without adenosine.    - Maintaining SR  - Hold off on AVN blockers  - can remain off telemetry HR 67-77 and //79 per flow sheet  - outpatient echo  - antibiotics per id  - abscess workup per id and gi  - Monitor and replete electrolytes. Keep K>4.0 and Mg>2.0.  - Further cardiac workup will depend on clinical course.   - All other workup per primary team. Will followup.     Cecilia Martinez NP-C   Cardiology 46 M presents to [place] ED with 1 month history of worsening abdominal pain, with diverticulitis of large intestine with abscess, s/p abscess drainage, with episode of SVT during procedure, now back in SR. This episode of SVT presumably occurred in the setting of stress. There is also the history of recent drug use  Did not require any medications, and converted back to SR without adenosine.    - Maintaining SR  - Hold off on AVN blockers  - can remain off telemetry   - outpatient echo  - antibiotics per id  - abscess workup per id and gi  - Monitor and replete electrolytes. Keep K>4.0 and Mg>2.0.  - Further cardiac workup will depend on clinical course.   - All other workup per primary team. Will followup.     Cecilia Martinez NP-C   Cardiology

## 2017-12-02 NOTE — PROGRESS NOTE ADULT - SUBJECTIVE AND OBJECTIVE BOX
HPI:  46 Y.O. M presents to [facility] with 1 month history of worsening abdominal pain. Pt reports pain started spontaneously and is globally about the abdomen and somewhat worse in the right lower quadrant region. Pt reports mild nausea and intermittent greenish diarrhea x past 1 month. Pt denies any history of abdominal surgery in the past but does report episode of colitis ~20 years ago treated with IV antibiotics only and no surgical intervention. Pt reports having a colonoscopy at the time but does not recall having any recent colonoscopy or upper endoscopy. Pt reports having lower grade fevers  and chills for the past few days. Pt reports 1 pisode of blood in his stool yesterday with no recurrence. Pt reports taking xanax and tylenol as needed for pain. Pt reports the symptoms have remained the same but patient decided to see PMD- Dr. Gates and Dr. Alcala today and was sent to ED for further management. Pt underwent CT Abd/Pelvis upon arrival to ED which revealed- Large thick walled collection in the lower abdomen in the midline suggestive of abscess with air-fluid level. A loop of small bowel is draped around this collection however the origin may be from adjacent diverticulitis in the proximal sigmoid colon. Nonobstructive right renal calculus. Thickened and nodular adrenal glands suggests hyperplasia. Mild thickening of the distal esophagus.    Denies any recent CP, SOB, dysuria. (29 Nov 2017 16:17)      SUBJECTIVE:  Patient is a 46y old  Male who presents with a chief complaint of Abdominal pain for one month. nausea, vomiting, fever since yesterday (29 Nov 2017 22:54)          OBJECTIVE:  Review Of Systems:  Constitutional: [ ] Fever [ ] Chills [ ] Fatigue [ ] Weight change   HEENT: [ ] Blurred vision [ ] Eye Pain [ ] Headache [ ] Runny nose [ ] Sore Throat   Respiratory: [ ] Cough [ ] Wheezing [ ] Shortness of breath  Cardiovascular: [ ] Chest Pain [ ] Palpitations [ ] ANG [ ] PND [ ] Orthopnea  Gastrointestinal: [ ] Abdominal Pain [ ] Diarrhea [ ] Constipation [ ] Hemorrhoids [ ] Nausea [ ] Vomiting  Genitourinary: [ ] Nocturia [ ] Dysuria [ ] Incontinence  Extremities: [ ] Swelling [ ] Joint Pain  Neurologic: [ ] Focal deficit [ ] Paresthesias [ ] Syncope  Lymphatic: [ ] Swelling [ ] Lymphadenopathy   Skin: [ ] Rash [ ] Ecchymoses [ ] Wounds [ ] Lesions  Psychiatry: [ ] Depression [ ] Suicidal/Homicidal Ideation [ ] Anxiety [ ] Sleep Disturbances  [ ] 10 point review of systems is otherwise negative except as mentioned above            [ ]Unable to obtain    Allergy:  Allergies    No Known Allergies    Intolerances        Medications:  MEDICATIONS  (STANDING):  pantoprazole  Injectable 40 milliGRAM(s) IV Push daily  piperacillin/tazobactam IVPB. 3.375 Gram(s) IV Intermittent every 8 hours    MEDICATIONS  (PRN):  acetaminophen   Tablet 650 milliGRAM(s) Oral every 6 hours PRN For Temp greater than 38 C (100.4 F)  morphine  - Injectable 2 milliGRAM(s) IV Push every 4 hours PRN Moderate Pain (4 - 6)  morphine  - Injectable 4 milliGRAM(s) IV Push every 6 hours PRN Severe Pain (7 - 10)  nicotine - 21 mG/24Hr(s) Patch 1 patch Transdermal daily PRN nicotine w/d sx  ondansetron Injectable 4 milliGRAM(s) IV Push every 8 hours PRN Nausea and/or Vomiting      PMH/PSH/FH/SH: [ ] Unchanged    Vitals:  T(C): 36.3 (12-02-17 @ 05:29), Max: 36.7 (12-01-17 @ 13:13)  HR: 70 (12-02-17 @ 05:29) (67 - 70)  BP: 118/70 (12-02-17 @ 05:29) (118/70 - 126/79)  BP(mean): --  RR: 16 (12-02-17 @ 05:29) (16 - 18)  SpO2: 96% (12-02-17 @ 05:29) (96% - 97%)  Wt(kg): --  Daily     Daily   I&O's Summary      Labs:                        12.2   11.2  )-----------( 516      ( 02 Dec 2017 06:56 )             38.1     12-01    139  |  103  |  10  ----------------------------<  89  4.3   |  31  |  1.00    Ca    8.3<L>      01 Dec 2017 07:09  Mg     2.3     12-01    TPro  6.7  /  Alb  2.2<L>  /  TBili  0.3  /  DBili  <.10  /  AST  30  /  ALT  66  /  AlkPhos  64  12-01                      ECG:    Echo:    Stress Testing:     Cath:    Imaging:  < from: CT Aspiration Abscess Hematoma, Cyst (11.29.17 @ 17:35) >  EXAM:  CT ASP ABSC HEMATOMA CYST#                            PROCEDURE DATE:  11/29/2017          INTERPRETATION:  CT guided abscess drainage    CLINICAL HISTORY:  Anterior pelvic abscess likely from a perforated   diverticulum of the colon. Percutaneous drainage was requested.    PROCEDURE: The patient was brought to the CT scanner and placed in a   supine position. Physiological monitoring and oxygen saturation were   assessed throughout the procedure.  A time-out was performed before   starting the procedure to verify that the correct patient was present for   the appropriate procedure. Intravenous conscious sedation was provided   for a period of approximately 30 minutes.    Multiple CT images of the patient's pelvic region were obtained. A   suitable level was selected and the skin at that level was prepped and   draped in the usual sterile fashion. After injection of local anesthesia   (Lidocaine 1%), a 21 gauge needle was advanced and its position was   verified with additional CT slices. The needle was readvanced accordingly   and fluid was removed. Guidewire and catheter exchanges were made to   advance a 10 French pigtail tipped catheter and additional fluid was   aspirated.    The specimen obtained was placed in appropriate laboratory solution or   container. Follow-up images were obtained. The catheter was secured to   the skin using adhesives and connected to an extra renal drainage bag. A   sterile dressing was then applied.     The patient tolerated the procedure well and was discharged from the CT   scanner in stable condition.     INTERPRETATION: Approximately 120 mL of foul-smelling pea-soup like fluid   were obtained. Follow-up images showed satisfactory coiling of the   pigtail catheter within the fluid collection.    Thank you for this referral.                 BRYANNA IBARRA M.D., ATTENDING RADIOLOGIST  This document has been electronically signed. Nov 30 2017  9:02AM        < end of copied text >  < from: CT Abdomen and Pelvis w/ Oral Cont (11.29.17 @ 13:59) >  EXAM:  CT ABDOMEN AND PELVIS OC                            PROCEDURE DATE:  11/29/2017          INTERPRETATION:  CLINICAL STATEMENT: diffuse lower abd pain    TECHNIQUE: CT of the abdomen and pelvis was performed without IV   contrast.  Oral contrast was administered. This scan was performed using   automatic exposure control (radiation dose reduction software) to obtain   a diagnostic image quality scan with patient dose as low as reasonably   achievable.       COMPARISON: None.    FINDINGS:    The lower chest is remarkable for mild thickening of the distal esophagus.    The evaluation of the abdominal viscera is limited without IV contrast.    The liver, spleen, and pancreas are grossly unremarkable. The adrenal   glands are thickened andnodular bilaterally suggesting hyperplasia The   partly contracted gallbladder is seen.    The kidneys are remarkable for punctate calcification in the right lower   pole. The fluid-filled bladder is unremarkable.    There is no bowel obstruction.  Probable duodenal diverticulum is noted.   There is significant diverticulosis of the sigmoid colon. There is a   fat-containing left inguinal hernia. There is a thick walled cystic mass   with an air-fluid level in the lower abdomen in the midline inseparable   from adjacent small bowel loop. This measures 10.7 x 7.0 x 6.9 cm.   Inflammatory changes are noted in the fat around the proximal sigmoid   colon. While this abscess is not directly contiguous with the sigmoid   colon, statistically this may be a diverticular abscess especially with   long-standing history of pain.    There is no intraperitoneal free air.  There is no free fluid. there is   no significant retroperitoneal or pelvic lymphadenopathy. The aorta is   not aneurysmal.    There is no abdominal or pelvic lymphadenopathy.  The pelvic structures   are grossly unremarkable.    The osseous structures demonstrate degenerative changes of the spine and   right hip. There is significant L5-S1 disc space narrowing..    IMPRESSION: Large thick walled collection in the lower abdomen in the   midline suggestive of abscess with air-fluid level. A loop of small bowel   is draped around this collection however the origin may be from adjacent   diverticulitis in the proximal sigmoid colon.  Nonobstructive right renal calculus  Thickened and nodular adrenal glands suggests hyperplasia.   Mild thickening of the distal esophagus  Results were discussed with Dr. Chao at 3:10 PM on November 29, 2017                MIN HEAD M.D.,ATTENDING RADIOLOGIST  This document has been electronically signed. Nov 29 2017  3:11PM    < end of copied text >    Interpretation of Telemetry:      Physical Exam:  Appearance: [ ] Normal  [ ] abnormal [ ] NAD   Eyes: [ ] PERRL [ ] EOMI  HENT: [ ] Normal [ ] Abnormal oral muscosa [ ]NC/AT  Cardiovascular: [ ] S1 [ ] S2 [ ] RRR [ ] m/r/g [ ]edema [ ] JVP  Procedural Access Site: [ ]  hematoma [ ] tender to palpation [ ] 2+ pulse [ ] bruit [ ] Ecchymosis  Respiratory: [ ] Clear to auscultation bilaterally  Gastrointestinal: [ ] Soft [ ] tenderness[ ] distension [ ] BS  Musculoskeletal: [ ] clubbing [ ] joint deformity   Neurologic: [ ] Non-focal  Lymphatic: [ ] lymphadenopathy  Psychiatry: [ ] AAOx3  [ ] confused [ ] disoriented [ ] Mood & affect appropriate  Skin: [ ]  rashes [ ] ecchymoses [ ] cyanosis HPI:  46 Y.O. M presents to [place] ED with 1 month history of worsening abdominal pain. Pt reports pain started spontaneously and is globally about the abdomen and somewhat worse in the right lower quadrant region. Pt reports mild nausea and intermittent greenish diarrhea x past 1 month. Pt denies any history of abdominal surgery in the past but does report episode of colitis ~20 years ago treated with IV antibiotics only and no surgical intervention. Pt reports having a colonoscopy at the time but does not recall having any recent colonoscopy or upper endoscopy. Pt reports having lower grade fevers  and chills for the past few days. Pt reports 1 pisode of blood in his stool yesterday with no recurrence. Pt reports taking xanax and tylenol as needed for pain. Pt reports the symptoms have remained the same but patient decided to see PMD- Dr. Gates and Dr. Alcala today and was sent to ED for further management. Pt underwent CT Abd/Pelvis upon arrival to ED which revealed- Large thick walled collection in the lower abdomen in the midline suggestive of abscess with air-fluid level. A loop of small bowel is draped around this collection however the origin may be from adjacent diverticulitis in the proximal sigmoid colon. Nonobstructive right renal calculus. Thickened and nodular adrenal glands suggests hyperplasia. Mild thickening of the distal esophagus.    Denies any recent CP, SOB, dysuria. (29 Nov 2017 16:17)      SUBJECTIVE:  Patient is a 46y old  Male who presents with a chief complaint of Abdominal pain for one month. nausea, vomiting, fever since yesterday (29 Nov 2017 22:54)          OBJECTIVE:  Review Of Systems:  Constitutional: [ ] Fever [ ] Chills [ ] Fatigue [ ] Weight change   HEENT: [ ] Blurred vision [ ] Eye Pain [ ] Headache [ ] Runny nose [ ] Sore Throat   Respiratory: [ ] Cough [ ] Wheezing [ ] Shortness of breath  Cardiovascular: [ ] Chest Pain [ ] Palpitations [ ] ANG [ ] PND [ ] Orthopnea  Gastrointestinal: [ ] Abdominal Pain [ ] Diarrhea [ ] Constipation [ ] Hemorrhoids [ ] Nausea [ ] Vomiting  Genitourinary: [ ] Nocturia [ ] Dysuria [ ] Incontinence  Extremities: [ ] Swelling [ ] Joint Pain  Neurologic: [ ] Focal deficit [ ] Paresthesias [ ] Syncope  Lymphatic: [ ] Swelling [ ] Lymphadenopathy   Skin: [ ] Rash [ ] Ecchymoses [ ] Wounds [ ] Lesions  Psychiatry: [ ] Depression [ ] Suicidal/Homicidal Ideation [ ] Anxiety [ ] Sleep Disturbances  [x ] 10 point review of systems is otherwise negative except as mentioned above            [ ]Unable to obtain    Allergy:  Allergies    No Known Allergies    Intolerances        Medications:  MEDICATIONS  (STANDING):  pantoprazole  Injectable 40 milliGRAM(s) IV Push daily  piperacillin/tazobactam IVPB. 3.375 Gram(s) IV Intermittent every 8 hours    MEDICATIONS  (PRN):  acetaminophen   Tablet 650 milliGRAM(s) Oral every 6 hours PRN For Temp greater than 38 C (100.4 F)  morphine  - Injectable 2 milliGRAM(s) IV Push every 4 hours PRN Moderate Pain (4 - 6)  morphine  - Injectable 4 milliGRAM(s) IV Push every 6 hours PRN Severe Pain (7 - 10)  nicotine - 21 mG/24Hr(s) Patch 1 patch Transdermal daily PRN nicotine w/d sx  ondansetron Injectable 4 milliGRAM(s) IV Push every 8 hours PRN Nausea and/or Vomiting      PMH/PSH/FH/SH: [ ] Unchanged    Vitals:  T(C): 36.3 (12-02-17 @ 05:29), Max: 36.7 (12-01-17 @ 13:13)  HR: 70 (12-02-17 @ 05:29) (67 - 70)  BP: 118/70 (12-02-17 @ 05:29) (118/70 - 126/79)  BP(mean): --  RR: 16 (12-02-17 @ 05:29) (16 - 18)  SpO2: 96% (12-02-17 @ 05:29) (96% - 97%)  Wt(kg): --  Daily     Daily   I&O's Summary      Labs:                        12.2   11.2  )-----------( 516      ( 02 Dec 2017 06:56 )             38.1     12-01    139  |  103  |  10  ----------------------------<  89  4.3   |  31  |  1.00    Ca    8.3<L>      01 Dec 2017 07:09  Mg     2.3     12-01    TPro  6.7  /  Alb  2.2<L>  /  TBili  0.3  /  DBili  <.10  /  AST  30  /  ALT  66  /  AlkPhos  64  12-01                      ECG:    Echo:    Stress Testing:     Cath:    Imaging:  < from: CT Aspiration Abscess Hematoma, Cyst (11.29.17 @ 17:35) >  EXAM:  CT ASP ABSC HEMATOMA CYST#                            PROCEDURE DATE:  11/29/2017          INTERPRETATION:  CT guided abscess drainage    CLINICAL HISTORY:  Anterior pelvic abscess likely from a perforated   diverticulum of the colon. Percutaneous drainage was requested.    PROCEDURE: The patient was brought to the CT scanner and placed in a   supine position. Physiological monitoring and oxygen saturation were   assessed throughout the procedure.  A time-out was performed before   starting the procedure to verify that the correct patient was present for   the appropriate procedure. Intravenous conscious sedation was provided   for a period of approximately 30 minutes.    Multiple CT images of the patient's pelvic region were obtained. A   suitable level was selected and the skin at that level was prepped and   draped in the usual sterile fashion. After injection of local anesthesia   (Lidocaine 1%), a 21 gauge needle was advanced and its position was   verified with additional CT slices. The needle was readvanced accordingly   and fluid was removed. Guidewire and catheter exchanges were made to   advance a 10 French pigtail tipped catheter and additional fluid was   aspirated.    The specimen obtained was placed in appropriate laboratory solution or   container. Follow-up images were obtained. The catheter was secured to   the skin using adhesives and connected to an extra renal drainage bag. A   sterile dressing was then applied.     The patient tolerated the procedure well and was discharged from the CT   scanner in stable condition.     INTERPRETATION: Approximately 120 mL of foul-smelling pea-soup like fluid   were obtained. Follow-up images showed satisfactory coiling of the   pigtail catheter within the fluid collection.    Thank you for this referral.                 BRYANNA IBARRA M.D., ATTENDING RADIOLOGIST  This document has been electronically signed. Nov 30 2017  9:02AM        < end of copied text >  < from: CT Abdomen and Pelvis w/ Oral Cont (11.29.17 @ 13:59) >  EXAM:  CT ABDOMEN AND PELVIS OC                            PROCEDURE DATE:  11/29/2017          INTERPRETATION:  CLINICAL STATEMENT: diffuse lower abd pain    TECHNIQUE: CT of the abdomen and pelvis was performed without IV   contrast.  Oral contrast was administered. This scan was performed using   automatic exposure control (radiation dose reduction software) to obtain   a diagnostic image quality scan with patient dose as low as reasonably   achievable.       COMPARISON: None.    FINDINGS:    The lower chest is remarkable for mild thickening of the distal esophagus.    The evaluation of the abdominal viscera is limited without IV contrast.    The liver, spleen, and pancreas are grossly unremarkable. The adrenal   glands are thickened andnodular bilaterally suggesting hyperplasia The   partly contracted gallbladder is seen.    The kidneys are remarkable for punctate calcification in the right lower   pole. The fluid-filled bladder is unremarkable.    There is no bowel obstruction.  Probable duodenal diverticulum is noted.   There is significant diverticulosis of the sigmoid colon. There is a   fat-containing left inguinal hernia. There is a thick walled cystic mass   with an air-fluid level in the lower abdomen in the midline inseparable   from adjacent small bowel loop. This measures 10.7 x 7.0 x 6.9 cm.   Inflammatory changes are noted in the fat around the proximal sigmoid   colon. While this abscess is not directly contiguous with the sigmoid   colon, statistically this may be a diverticular abscess especially with   long-standing history of pain.    There is no intraperitoneal free air.  There is no free fluid. there is   no significant retroperitoneal or pelvic lymphadenopathy. The aorta is   not aneurysmal.    There is no abdominal or pelvic lymphadenopathy.  The pelvic structures   are grossly unremarkable.    The osseous structures demonstrate degenerative changes of the spine and   right hip. There is significant L5-S1 disc space narrowing..    IMPRESSION: Large thick walled collection in the lower abdomen in the   midline suggestive of abscess with air-fluid level. A loop of small bowel   is draped around this collection however the origin may be from adjacent   diverticulitis in the proximal sigmoid colon.  Nonobstructive right renal calculus  Thickened and nodular adrenal glands suggests hyperplasia.   Mild thickening of the distal esophagus  Results were discussed with Dr. Chao at 3:10 PM on November 29, 2017                MIN HEAD M.D.,ATTENDING RADIOLOGIST  This document has been electronically signed. Nov 29 2017  3:11PM    < end of copied text >    Interpretation of Telemetry:      Physical Exam:  Appearance: [x ] Normal  [ ] abnormal [x ] NAD   Eyes: [x ] PERRL [x ] EOMI  HENT: [x ] Normal [ ] Abnormal oral muscosa [x ]NC/AT  Cardiovascular: [ x] S1 [x ] S2 [x ] RRR [ ] m/r/g [ ]edema [ ] JVP  Procedural Access Site: [ ]  hematoma [ ] tender to palpation [ ] 2+ pulse [ ] bruit [ ] Ecchymosis  Respiratory: [x ] Clear to auscultation bilaterally  Gastrointestinal: [x ] Soft [ ] tenderness[ ] distension [x ] BS  Musculoskeletal: [ ] clubbing [ ] joint deformity   Neurologic: [ ] Non-focal  Lymphatic: [ ] lymphadenopathy  Psychiatry: [x ] AAOx3  [ ] confused [ ] disoriented [x ] Mood & affect appropriate  Skin: [ ]  rashes [ ] ecchymoses [ ] cyanosis

## 2017-12-02 NOTE — PROGRESS NOTE ADULT - SUBJECTIVE AND OBJECTIVE BOX
Patient is a 46y old  Male who presents with a chief complaint of Abdominal pain for one month. nausea, vomiting, fever since yesterday (29 Nov 2017 22:54)      INTERVAL HPI/OVERNIGHT EVENTS: Patient seen and examined. NAD. No complaints.          acetaminophen   Tablet 650 milliGRAM(s) Oral every 6 hours PRN  morphine  - Injectable 2 milliGRAM(s) IV Push every 4 hours PRN  morphine  - Injectable 4 milliGRAM(s) IV Push every 6 hours PRN  nicotine - 21 mG/24Hr(s) Patch 1 patch Transdermal daily PRN  ondansetron Injectable 4 milliGRAM(s) IV Push every 8 hours PRN  pantoprazole  Injectable 40 milliGRAM(s) IV Push daily  piperacillin/tazobactam IVPB. 3.375 Gram(s) IV Intermittent every 8 hours      REVIEW OF SYSTEMS:  CONSTITUTIONAL: No fever, no weight loss, or no fatigue  NECK: No pain, no stiffness  RESPIRATORY: No cough, no wheezing, no chills, no hemoptysis, No shortness of breath  CARDIOVASCULAR: No chest pain, no palpitations, no dizziness, no leg swelling  GASTROINTESTINAL: No abdominal pain. No nausea, no vomiting, no hematemesis; No diarrhea, no constipation. No melena, no hematochezia.  GENITOURINARY: No dysuria, no frequency, no hematuria, no incontinence  NEUROLOGICAL: No headaches, no loss of strength, no numbness, no tremors  SKIN: No itching, no burning  MUSCULOSKELETAL: No joint pain, no swelling; No muscle, no back, no extremity pain  PSYCHIATRIC: No depression, no mood swings,   HEME/LYMPH: No easy bruising, no bleeding gums  ALLERY AND IMMUNOLOGIC: No hives       Consultant(s) Notes Reviewed:  [x ] YES  [ ] NO    PHYSICAL EXAM:  GENERAL: NAD  HEAD:  Atraumatic, Normocephalic  EYES: EOMI, PERRLA, conjunctiva and sclera clear  ENMT: No tonsillar erythema, exudates, or enlargement; Moist mucous membranes  NECK: Supple, No JVD  NERVOUS SYSTEM:  Awake & alert  CHEST/LUNG: Clear to auscultation bilaterally; No rales, rhonchi, wheezing,  HEART: Regular rate and rhythm  ABDOMEN: Soft, Nontender, Nondistended; Bowel sounds present  EXTREMITIES:  No clubbing, cyanosis, or edema  LYMPH: No lymphadenopathy noted  SKIN: No rashes      Advanced care planning discussed with patient/family [x ] YES   [ ] NO

## 2017-12-02 NOTE — PROGRESS NOTE ADULT - SUBJECTIVE AND OBJECTIVE BOX
INTERVAL HPI/OVERNIGHT EVENTS:  denies abd pain/diarrhea  feels better    HPI:  46 Y.O. M presents to [facility] ED with 1 month history of worsening abdominal pain. Pt reports pain started spontaneously and is globally about the abdomen and somewhat worse in the right lower quadrant region. Pt reports mild nausea and intermittent greenish diarrhea x past 1 month. Pt denies any history of abdominal surgery in the past but does report episode of colitis ~20 years ago treated with IV antibiotics only and no surgical intervention. Pt reports having a colonoscopy at the time but does not recall having any recent colonoscopy or upper endoscopy. Pt reports having lower grade fevers  and chills for the past few days. Pt reports 1 pisode of blood in his stool yesterday with no recurrence. Pt reports taking xanax and tylenol as needed for pain. Pt reports the symptoms have remained the same but patient decided to see PMD- Dr. Gates and Dr. Alcala today and was sent to ED for further management. Pt underwent CT Abd/Pelvis upon arrival to ED which revealed- Large thick walled collection in the lower abdomen in the midline suggestive of abscess with air-fluid level. A loop of small bowel is draped around this collection however the origin may be from adjacent diverticulitis in the proximal sigmoid colon. Nonobstructive right renal calculus. Thickened and nodular adrenal glands suggests hyperplasia. Mild thickening of the distal esophagus.    Denies any recent CP, SOB, dysuria. (29 Nov 2017 16:17)    MEDICATIONS  (STANDING):  lactated ringers. 1000 milliLiter(s) (80 mL/Hr) IV Continuous <Continuous>  pantoprazole  Injectable 40 milliGRAM(s) IV Push daily  piperacillin/tazobactam IVPB. 3.375 Gram(s) IV Intermittent every 8 hours    MEDICATIONS  (PRN):  acetaminophen   Tablet 650 milliGRAM(s) Oral every 6 hours PRN For Temp greater than 38 C (100.4 F)  morphine  - Injectable 2 milliGRAM(s) IV Push every 4 hours PRN Moderate Pain (4 - 6)  morphine  - Injectable 4 milliGRAM(s) IV Push every 6 hours PRN Severe Pain (7 - 10)  nicotine - 21 mG/24Hr(s) Patch 1 patch Transdermal daily PRN nicotine w/d sx  ondansetron Injectable 4 milliGRAM(s) IV Push every 8 hours PRN Nausea and/or Vomiting      Allergies    No Known Allergies    Intolerances          General:  No wt loss, fevers, chills, night sweats, fatigue,   Eyes:  Good vision, no reported pain  ENT:  No sore throat, pain, runny nose, dysphagia  CV:  No pain, palpitations, hypo/hypertension  Resp:  No dyspnea, cough, tachypnea, wheezing  GI:  No pain, No nausea, No vomiting, No diarrhea, No constipation, No weight loss, No fever, No pruritis, No rectal bleeding, No tarry stools, No dysphagia,  :  No pain, bleeding, incontinence, nocturia  Muscle:  No pain, weakness  Neuro:  No weakness, tingling, memory problems  Psych:  No fatigue, insomnia, mood problems, depression  Endocrine:  No polyuria, polydipsia, cold/heat intolerance  Heme:  No petechiae, ecchymosis, easy bruisability  Skin:  No rash, tattoos, scars, edema      PHYSICAL EXAM:   Vital Signs:  Vital Signs Last 24 Hrs  T(C): 36.7 (01 Dec 2017 13:13), Max: 36.8 (30 Nov 2017 21:06)  T(F): 98 (01 Dec 2017 13:13), Max: 98.3 (30 Nov 2017 21:06)  HR: 69 (01 Dec 2017 13:13) (69 - 77)  BP: 126/79 (01 Dec 2017 13:13) (115/73 - 126/79)  BP(mean): --  RR: 18 (01 Dec 2017 13:13) (17 - 18)  SpO2: 97% (01 Dec 2017 13:13) (96% - 98%)  Daily     Daily I&O's Summary    30 Nov 2017 07:01  -  01 Dec 2017 07:00  --------------------------------------------------------  IN: 2020 mL / OUT: 15 mL / NET: 2005 mL        GENERAL:  Appears stated age, well-groomed, well-nourished, no distress  HEENT:  NC/AT,  conjunctivae clear and pink, no thyromegaly, nodules, adenopathy, no JVD, sclera -anicteric  CHEST:  Full & symmetric excursion, no increased effort, breath sounds clear  HEART:  Regular rhythm, S1, S2, no murmur/rub/S3/S4, no abdominal bruit, no edema  ABDOMEN:  Soft, non-tender, non-distended, normoactive bowel sounds,  no masses ,no hepato-splenomegaly, no signs of chronic liver disease, (+) abscess drain  EXTEREMITIES:  no cyanosis,clubbing or edema  SKIN:  No rash/erythema/ecchymoses/petechiae/wounds/abscess/warm/dry  NEURO:  Alert, oriented, no asterixis, no tremor, no encephalopathy      LABS:                        12.0   12.0  )-----------( 492      ( 01 Dec 2017 07:09 )             38.3     12-01    139  |  103  |  10  ----------------------------<  89  4.3   |  31  |  1.00    Ca    8.3<L>      01 Dec 2017 07:09  Phos  3.0     11-30  Mg     2.3     12-01    TPro  6.7  /  Alb  2.2<L>  /  TBili  0.3  /  DBili  <.10  /  AST  30  /  ALT  66  /  AlkPhos  64  12-01        amylaseAmylase, Serum Total: 20 U/L (11-29 @ 11:24)     lipaseLipase, Serum: 104 U/L (11-29 @ 11:24)    RADIOLOGY & ADDITIONAL TESTS:

## 2017-12-02 NOTE — PROGRESS NOTE ADULT - SUBJECTIVE AND OBJECTIVE BOX
UCHE BE  [id number] 46y    GENERAL SURGERY/ DR. BEDOLLA    TOLERATING REGULAR DIET  NO N/V, ABDOMINAL PAIN, FEVER  + BM    Vital Signs Last 24 Hrs  T(C): 36.3 (02 Dec 2017 05:29), Max: 36.7 (01 Dec 2017 13:13)  T(F): 97.3 (02 Dec 2017 05:29), Max: 98 (01 Dec 2017 13:13)  HR: 70 (02 Dec 2017 05:29) (67 - 70)  BP: 118/70 (02 Dec 2017 05:29) (118/70 - 126/79)  BP(mean): --  RR: 16 (02 Dec 2017 05:29) (16 - 18)  SpO2: 96% (02 Dec 2017 05:29) (96% - 97%)  DRAIN -0-    ABDOMEN    DRAIN IN PLACE, NO OUT PUY FOR THE LAST 24 HOURS                      + BS,  EXCEPT FOR DRAIN AREA , REST OF THE ABDOMEN IS                        SOFT, NON DISTENDED, NON-TENDER                            12.0   12.0  )-----------( 492      ( 01 Dec 2017 07:09 )             38.3      12-01    139  |  103  |  10  ----------------------------<  89  4.3   |  31  |  1.00    Ca    8.3<L>      01 Dec 2017 07:09  Mg     2.3     12-01    TPro  6.7  /  Alb  2.2<L>  /  TBili  0.3  /  DBili  <.10  /  AST  30  /  ALT  66  /  AlkPhos  64  12-01      Culture - Body Fluid with Gram Stain (11.29.17 @ 21:24)    Gram Stain:   Numerous polymorphonuclear leukocytes per low power field  Numerous Gram Positive Cocci in Pairs and Chains per oil power field  Numerous Gram Negative Rods per oil power field    -  Amikacin: S <=8    -  Ampicillin: R >16    -  Ampicillin/Sulbactam: R >16/8    -  Aztreonam: S <=4    -  Cefazolin: S 8    -  Cefepime: S <=2    -  Cefoxitin: S <=4    -  Ceftazidime: S <=1    -  Ceftriaxone: S <=1    -  Ciprofloxacin: S <=0.5    -  Ertapenem: S <=0.5    -  Gentamicin: S 2    -  Imipenem: S <=1    -  Levofloxacin: S <=1    -  Meropenem: S <=1    -  Piperacillin/Tazobactam: S <=8    -  Tobramycin: S <=2    -  Trimethoprim/Sulfamethoxazole: R >2/38    Specimen Source: Abdominal Fl Abdominal Fluid    Culture Results:   Numerous Escherichia coli  Numerous Streptococcus anginosus    Organism Identification: Escherichia coli    Organism: Escherichia coli    Method Type: TATIANA               Culture - Urine (11.29.17 @ 18:38)    Specimen Source: .Urine Clean Catch (Midstream)    Culture Results:   No growth    Culture - Blood (11.29.17 @ 18:34)    Specimen Source: .Blood Blood-Venous    Culture Results:   No growth to date.    Culture - Blood (11.29.17 @ 18:34)    Specimen Source: .Blood Blood-Venous    Culture Results:   No growth to date.    ASSESSMENT &  PLAN: PELVIC ABSCESS S/P CT GUIDED DRAINAGE DAY # 3    TOLERATING REGULAR DIET, + BM, ON ZOSYN  DRAIN EVALUATION ON MONDAY   MEDICAL, GI, ID, CARDIOLOGY FOLLOW UP NOTED

## 2017-12-03 LAB
CULTURE RESULTS: SIGNIFICANT CHANGE UP
ORGANISM # SPEC MICROSCOPIC CNT: SIGNIFICANT CHANGE UP
ORGANISM # SPEC MICROSCOPIC CNT: SIGNIFICANT CHANGE UP
SPECIMEN SOURCE: SIGNIFICANT CHANGE UP

## 2017-12-03 PROCEDURE — 99233 SBSQ HOSP IP/OBS HIGH 50: CPT

## 2017-12-03 PROCEDURE — 99232 SBSQ HOSP IP/OBS MODERATE 35: CPT

## 2017-12-03 RX ADMIN — PIPERACILLIN AND TAZOBACTAM 25 GRAM(S): 4; .5 INJECTION, POWDER, LYOPHILIZED, FOR SOLUTION INTRAVENOUS at 06:41

## 2017-12-03 RX ADMIN — MORPHINE SULFATE 4 MILLIGRAM(S): 50 CAPSULE, EXTENDED RELEASE ORAL at 17:22

## 2017-12-03 RX ADMIN — PIPERACILLIN AND TAZOBACTAM 25 GRAM(S): 4; .5 INJECTION, POWDER, LYOPHILIZED, FOR SOLUTION INTRAVENOUS at 16:23

## 2017-12-03 RX ADMIN — MORPHINE SULFATE 4 MILLIGRAM(S): 50 CAPSULE, EXTENDED RELEASE ORAL at 17:25

## 2017-12-03 RX ADMIN — MORPHINE SULFATE 4 MILLIGRAM(S): 50 CAPSULE, EXTENDED RELEASE ORAL at 11:07

## 2017-12-03 RX ADMIN — MORPHINE SULFATE 4 MILLIGRAM(S): 50 CAPSULE, EXTENDED RELEASE ORAL at 04:13

## 2017-12-03 RX ADMIN — MORPHINE SULFATE 4 MILLIGRAM(S): 50 CAPSULE, EXTENDED RELEASE ORAL at 11:12

## 2017-12-03 RX ADMIN — PIPERACILLIN AND TAZOBACTAM 25 GRAM(S): 4; .5 INJECTION, POWDER, LYOPHILIZED, FOR SOLUTION INTRAVENOUS at 22:52

## 2017-12-03 RX ADMIN — MORPHINE SULFATE 4 MILLIGRAM(S): 50 CAPSULE, EXTENDED RELEASE ORAL at 23:31

## 2017-12-03 RX ADMIN — MORPHINE SULFATE 4 MILLIGRAM(S): 50 CAPSULE, EXTENDED RELEASE ORAL at 04:43

## 2017-12-03 RX ADMIN — PANTOPRAZOLE SODIUM 40 MILLIGRAM(S): 20 TABLET, DELAYED RELEASE ORAL at 13:08

## 2017-12-03 NOTE — PROGRESS NOTE ADULT - SUBJECTIVE AND OBJECTIVE BOX
Patient is a 46y old  Male who presents with a chief complaint of Abdominal pain for one month. nausea, vomiting, fever since yesterday (29 Nov 2017 22:54)      INTERVAL HPI/OVERNIGHT EVENTS: Patient seen and examined. NAD. No complaints.    Vital Signs Last 24 Hrs  T(C): 36.7 (03 Dec 2017 09:34), Max: 36.8 (02 Dec 2017 13:18)  T(F): 98.1 (03 Dec 2017 09:34), Max: 98.3 (03 Dec 2017 05:59)  HR: 76 (03 Dec 2017 09:34) (72 - 81)  BP: 139/89 (03 Dec 2017 05:59) (121/79 - 139/89)  BP(mean): --  RR: 18 (03 Dec 2017 05:59) (16 - 18)  SpO2: 97% (03 Dec 2017 05:59) (97% - 98%)    12-02    143  |  106  |  8   ----------------------------<  80  3.8   |  29  |  0.98    Ca    8.1<L>      02 Dec 2017 06:56  Mg     2.2     12-02    TPro  6.8  /  Alb  2.2<L>  /  TBili  0.2  /  DBili  x   /  AST  29  /  ALT  66  /  AlkPhos  67  12-02                          12.2   11.2  )-----------( 516      ( 02 Dec 2017 06:56 )             38.1       CAPILLARY BLOOD GLUCOSE                      acetaminophen   Tablet 650 milliGRAM(s) Oral every 6 hours PRN  morphine  - Injectable 2 milliGRAM(s) IV Push every 4 hours PRN  morphine  - Injectable 4 milliGRAM(s) IV Push every 6 hours PRN  nicotine - 21 mG/24Hr(s) Patch 1 patch Transdermal daily PRN  ondansetron Injectable 4 milliGRAM(s) IV Push every 8 hours PRN  pantoprazole  Injectable 40 milliGRAM(s) IV Push daily  piperacillin/tazobactam IVPB. 3.375 Gram(s) IV Intermittent every 8 hours      REVIEW OF SYSTEMS:  CONSTITUTIONAL: No fever, no weight loss, or no fatigue  NECK: No pain, no stiffness  RESPIRATORY: No cough, no wheezing, no chills, no hemoptysis, No shortness of breath  CARDIOVASCULAR: No chest pain, no palpitations, no dizziness, no leg swelling  GASTROINTESTINAL: No abdominal pain. No nausea, no vomiting, no hematemesis; No diarrhea, no constipation. No melena, no hematochezia.  GENITOURINARY: No dysuria, no frequency, no hematuria, no incontinence  NEUROLOGICAL: No headaches, no loss of strength, no numbness, no tremors  SKIN: No itching, no burning  MUSCULOSKELETAL: No joint pain, no swelling; No muscle, no back, no extremity pain  PSYCHIATRIC: No depression, no mood swings,   HEME/LYMPH: No easy bruising, no bleeding gums  ALLERY AND IMMUNOLOGIC: No hives       Consultant(s) Notes Reviewed:  [ x] YES  [ ] NO    PHYSICAL EXAM:  GENERAL: NAD  HEAD:  Atraumatic, Normocephalic  EYES: EOMI, PERRLA, conjunctiva and sclera clear  ENMT: No tonsillar erythema, exudates, or enlargement; Moist mucous membranes  NECK: Supple, No JVD  NERVOUS SYSTEM:  Awake & alert  CHEST/LUNG: Clear to auscultation bilaterally; No rales, rhonchi, wheezing,  HEART: Regular rate and rhythm  ABDOMEN: Soft, Nontender, Nondistended; Bowel sounds present  EXTREMITIES:  No clubbing, cyanosis, or edema  LYMPH: No lymphadenopathy noted  SKIN: No rashes      Advanced care planning discussed with patient/family [ x] YES   [ ] NO

## 2017-12-03 NOTE — PROGRESS NOTE ADULT - SUBJECTIVE AND OBJECTIVE BOX
INTERVAL HPI/OVERNIGHT EVENTS:  denies abd pain/diarrhea  feels better    HPI:  46 Y.O. M presents to [facility] ED with 1 month history of worsening abdominal pain. Pt reports pain started spontaneously and is globally about the abdomen and somewhat worse in the right lower quadrant region. Pt reports mild nausea and intermittent greenish diarrhea x past 1 month. Pt denies any history of abdominal surgery in the past but does report episode of colitis ~20 years ago treated with IV antibiotics only and no surgical intervention. Pt reports having a colonoscopy at the time but does not recall having any recent colonoscopy or upper endoscopy. Pt reports having lower grade fevers  and chills for the past few days. Pt reports 1 pisode of blood in his stool yesterday with no recurrence. Pt reports taking xanax and tylenol as needed for pain. Pt reports the symptoms have remained the same but patient decided to see PMD- Dr. Gates and Dr. Alcala today and was sent to ED for further management. Pt underwent CT Abd/Pelvis upon arrival to ED which revealed- Large thick walled collection in the lower abdomen in the midline suggestive of abscess with air-fluid level. A loop of small bowel is draped around this collection however the origin may be from adjacent diverticulitis in the proximal sigmoid colon. Nonobstructive right renal calculus. Thickened and nodular adrenal glands suggests hyperplasia. Mild thickening of the distal esophagus.    Denies any recent CP, SOB, dysuria. (29 Nov 2017 16:17)    MEDICATIONS  (STANDING):  lactated ringers. 1000 milliLiter(s) (80 mL/Hr) IV Continuous <Continuous>  pantoprazole  Injectable 40 milliGRAM(s) IV Push daily  piperacillin/tazobactam IVPB. 3.375 Gram(s) IV Intermittent every 8 hours    MEDICATIONS  (PRN):  acetaminophen   Tablet 650 milliGRAM(s) Oral every 6 hours PRN For Temp greater than 38 C (100.4 F)  morphine  - Injectable 2 milliGRAM(s) IV Push every 4 hours PRN Moderate Pain (4 - 6)  morphine  - Injectable 4 milliGRAM(s) IV Push every 6 hours PRN Severe Pain (7 - 10)  nicotine - 21 mG/24Hr(s) Patch 1 patch Transdermal daily PRN nicotine w/d sx  ondansetron Injectable 4 milliGRAM(s) IV Push every 8 hours PRN Nausea and/or Vomiting      Allergies    No Known Allergies    Intolerances          General:  No wt loss, fevers, chills, night sweats, fatigue,   Eyes:  Good vision, no reported pain  ENT:  No sore throat, pain, runny nose, dysphagia  CV:  No pain, palpitations, hypo/hypertension  Resp:  No dyspnea, cough, tachypnea, wheezing  GI:  No pain, No nausea, No vomiting, No diarrhea, No constipation, No weight loss, No fever, No pruritis, No rectal bleeding, No tarry stools, No dysphagia,  :  No pain, bleeding, incontinence, nocturia  Muscle:  No pain, weakness  Neuro:  No weakness, tingling, memory problems  Psych:  No fatigue, insomnia, mood problems, depression  Endocrine:  No polyuria, polydipsia, cold/heat intolerance  Heme:  No petechiae, ecchymosis, easy bruisability  Skin:  No rash, tattoos, scars, edema      PHYSICAL EXAM:   Vital Signs:  Vital Signs Last 24 Hrs  T(C): 36.7 (01 Dec 2017 13:13), Max: 36.8 (30 Nov 2017 21:06)  T(F): 98 (01 Dec 2017 13:13), Max: 98.3 (30 Nov 2017 21:06)  HR: 69 (01 Dec 2017 13:13) (69 - 77)  BP: 126/79 (01 Dec 2017 13:13) (115/73 - 126/79)  BP(mean): --  RR: 18 (01 Dec 2017 13:13) (17 - 18)  SpO2: 97% (01 Dec 2017 13:13) (96% - 98%)  Daily     Daily I&O's Summary    30 Nov 2017 07:01  -  01 Dec 2017 07:00  --------------------------------------------------------  IN: 2020 mL / OUT: 15 mL / NET: 2005 mL        GENERAL:  Appears stated age, well-groomed, well-nourished, no distress  HEENT:  NC/AT,  conjunctivae clear and pink, no thyromegaly, nodules, adenopathy, no JVD, sclera -anicteric  CHEST:  Full & symmetric excursion, no increased effort, breath sounds clear  HEART:  Regular rhythm, S1, S2, no murmur/rub/S3/S4, no abdominal bruit, no edema  ABDOMEN:  Soft, non-tender, non-distended, normoactive bowel sounds,  no masses ,no hepato-splenomegaly, no signs of chronic liver disease, (+) abscess drain  EXTEREMITIES:  no cyanosis,clubbing or edema  SKIN:  No rash/erythema/ecchymoses/petechiae/wounds/abscess/warm/dry  NEURO:  Alert, oriented, no asterixis, no tremor, no encephalopathy      LABS:                        12.0   12.0  )-----------( 492      ( 01 Dec 2017 07:09 )             38.3     12-01    139  |  103  |  10  ----------------------------<  89  4.3   |  31  |  1.00    Ca    8.3<L>      01 Dec 2017 07:09  Phos  3.0     11-30  Mg     2.3     12-01    TPro  6.7  /  Alb  2.2<L>  /  TBili  0.3  /  DBili  <.10  /  AST  30  /  ALT  66  /  AlkPhos  64  12-01        amylaseAmylase, Serum Total: 20 U/L (11-29 @ 11:24)     lipaseLipase, Serum: 104 U/L (11-29 @ 11:24)    RADIOLOGY & ADDITIONAL TESTS:

## 2017-12-03 NOTE — PROGRESS NOTE ADULT - SUBJECTIVE AND OBJECTIVE BOX
HPI:  46 Y.O. M presents to [facility] ED with 1 month history of worsening abdominal pain. Pt reports pain started spontaneously and is globally about the abdomen and somewhat worse in the right lower quadrant region. Pt reports mild nausea and intermittent greenish diarrhea x past 1 month. Pt denies any history of abdominal surgery in the past but does report episode of colitis ~20 years ago treated with IV antibiotics only and no surgical intervention. Pt reports having a colonoscopy at the time but does not recall having any recent colonoscopy or upper endoscopy. Pt reports having lower grade fevers  and chills for the past few days. Pt reports 1 pisode of blood in his stool yesterday with no recurrence. Pt reports taking xanax and tylenol as needed for pain. Pt reports the symptoms have remained the same but patient decided to see PMD- Dr. Gates and Dr. Alcala today and was sent to ED for further management. Pt underwent CT Abd/Pelvis upon arrival to ED which revealed- Large thick walled collection in the lower abdomen in the midline suggestive of abscess with air-fluid level. A loop of small bowel is draped around this collection however the origin may be from adjacent diverticulitis in the proximal sigmoid colon. Nonobstructive right renal calculus. Thickened and nodular adrenal glands suggests hyperplasia. Mild thickening of the distal esophagus.    Denies any recent CP, SOB, dysuria. (29 Nov 2017 16:17)      SUBJECTIVE:  Patient is a 46y old  Male who presents with a chief complaint of Abdominal pain for one month. nausea, vomiting, fever since yesterday (29 Nov 2017 22:54)          OBJECTIVE:  Review Of Systems:  Constitutional: [ ] Fever [ ] Chills [ ] Fatigue [ ] Weight change   HEENT: [ ] Blurred vision [ ] Eye Pain [ ] Headache [ ] Runny nose [ ] Sore Throat   Respiratory: [ ] Cough [ ] Wheezing [ ] Shortness of breath  Cardiovascular: [ ] Chest Pain [ ] Palpitations [ ] ANG [ ] PND [ ] Orthopnea  Gastrointestinal: [ ] Abdominal Pain [ ] Diarrhea [ ] Constipation [ ] Hemorrhoids [ ] Nausea [ ] Vomiting  Genitourinary: [ ] Nocturia [ ] Dysuria [ ] Incontinence  Extremities: [ ] Swelling [ ] Joint Pain  Neurologic: [ ] Focal deficit [ ] Paresthesias [ ] Syncope  Lymphatic: [ ] Swelling [ ] Lymphadenopathy   Skin: [ ] Rash [ ] Ecchymoses [ ] Wounds [ ] Lesions  Psychiatry: [ ] Depression [ ] Suicidal/Homicidal Ideation [ ] Anxiety [ ] Sleep Disturbances  [x ] 10 point review of systems is otherwise negative except as mentioned above            [ ]Unable to obtain    Allergy:  Allergies    No Known Allergies    Intolerances        Medications:  MEDICATIONS  (STANDING):  pantoprazole  Injectable 40 milliGRAM(s) IV Push daily  piperacillin/tazobactam IVPB. 3.375 Gram(s) IV Intermittent every 8 hours    MEDICATIONS  (PRN):  acetaminophen   Tablet 650 milliGRAM(s) Oral every 6 hours PRN For Temp greater than 38 C (100.4 F)  morphine  - Injectable 2 milliGRAM(s) IV Push every 4 hours PRN Moderate Pain (4 - 6)  morphine  - Injectable 4 milliGRAM(s) IV Push every 6 hours PRN Severe Pain (7 - 10)  nicotine - 21 mG/24Hr(s) Patch 1 patch Transdermal daily PRN nicotine w/d sx  ondansetron Injectable 4 milliGRAM(s) IV Push every 8 hours PRN Nausea and/or Vomiting      PMH/PSH/FH/SH: [ ] Unchanged    Vitals:  T(C): 36.8 (12-03-17 @ 05:59), Max: 36.8 (12-02-17 @ 13:18)  HR: 73 (12-03-17 @ 05:59) (72 - 81)  BP: 139/89 (12-03-17 @ 05:59) (121/79 - 139/89)  BP(mean): --  RR: 18 (12-03-17 @ 05:59) (16 - 18)  SpO2: 97% (12-03-17 @ 05:59) (97% - 98%)  Wt(kg): --  Daily     Daily   I&O's Summary    02 Dec 2017 07:01  -  03 Dec 2017 07:00  --------------------------------------------------------  IN: 700 mL / OUT: 0 mL / NET: 700 mL        Labs:                        12.2   11.2  )-----------( 516      ( 02 Dec 2017 06:56 )             38.1     12-02    143  |  106  |  8   ----------------------------<  80  3.8   |  29  |  0.98    Ca    8.1<L>      02 Dec 2017 06:56  Mg     2.2     12-02    TPro  6.8  /  Alb  2.2<L>  /  TBili  0.2  /  DBili  x   /  AST  29  /  ALT  66  /  AlkPhos  67  12-02                      ECG:    Echo:    Stress Testing:     Cath:    Imaging:  < from: CT Aspiration Abscess Hematoma, Cyst (11.29.17 @ 17:35) >  EXAM:  CT ASP ABSC HEMATOMA CYST#                            PROCEDURE DATE:  11/29/2017          INTERPRETATION:  CT guided abscess drainage    CLINICAL HISTORY:  Anterior pelvic abscess likely from a perforated   diverticulum of the colon. Percutaneous drainage was requested.    PROCEDURE: The patient was brought to the CT scanner and placed in a   supine position. Physiological monitoring and oxygen saturation were   assessed throughout the procedure.  A time-out was performed before   starting the procedure to verify that the correct patient was present for   the appropriate procedure. Intravenous conscious sedation was provided   for a period of approximately 30 minutes.    Multiple CT images of the patient's pelvic region were obtained. A   suitable level was selected and the skin at that level was prepped and   draped in the usual sterile fashion. After injection of local anesthesia   (Lidocaine 1%), a 21 gauge needle was advanced and its position was   verified with additional CT slices. The needle was readvanced accordingly   and fluid was removed. Guidewire and catheter exchanges were made to   advance a 10 French pigtail tipped catheter and additional fluid was   aspirated.    The specimen obtained was placed in appropriate laboratory solution or   container. Follow-up images were obtained. The catheter was secured to   the skin using adhesives and connected to an extra renal drainage bag. A   sterile dressing was then applied.     The patient tolerated the procedure well and was discharged from the CT   scanner in stable condition.     INTERPRETATION: Approximately 120 mL of foul-smelling pea-soup like fluid   were obtained. Follow-up images showed satisfactory coiling of the   pigtail catheter within the fluid collection.    Thank you for this referral.                 BRYANNA IBARRA M.D., ATTENDING RADIOLOGIST  This document has been electronically signed. Nov 30 2017  9:02AM          < end of copied text >  < from: CT Abdomen and Pelvis w/ Oral Cont (11.29.17 @ 13:59) >    EXAM:  CT ABDOMEN AND PELVIS OC                            PROCEDURE DATE:  11/29/2017          INTERPRETATION:  CLINICAL STATEMENT: diffuse lower abd pain    TECHNIQUE: CT of the abdomen and pelvis was performed without IV   contrast.  Oral contrast was administered. This scan was performed using   automatic exposure control (radiation dose reduction software) to obtain   a diagnostic image quality scan with patient dose as low as reasonably   achievable.       COMPARISON: None.    FINDINGS:    The lower chest is remarkable for mild thickening of the distal esophagus.    The evaluation of the abdominal viscera is limited without IV contrast.    The liver, spleen, and pancreas are grossly unremarkable. The adrenal   glands are thickened andnodular bilaterally suggesting hyperplasia The   partly contracted gallbladder is seen.    The kidneys are remarkable for punctate calcification in the right lower   pole. The fluid-filled bladder is unremarkable.    There is no bowel obstruction.  Probable duodenal diverticulum is noted.   There is significant diverticulosis of the sigmoid colon. There is a   fat-containing left inguinal hernia. There is a thick walled cystic mass   with an air-fluid level in the lower abdomen in the midline inseparable   from adjacent small bowel loop. This measures 10.7 x 7.0 x 6.9 cm.   Inflammatory changes are noted in the fat around the proximal sigmoid   colon. While this abscess is not directly contiguous with the sigmoid   colon, statistically this may be a diverticular abscess especially with   long-standing history of pain.    There is no intraperitoneal free air.  There is no free fluid. there is   no significant retroperitoneal or pelvic lymphadenopathy. The aorta is   not aneurysmal.    There is no abdominal or pelvic lymphadenopathy.  The pelvic structures   are grossly unremarkable.    The osseous structures demonstrate degenerative changes of the spine and   right hip. There is significant L5-S1 disc space narrowing..    IMPRESSION: Large thick walled collection in the lower abdomen in the   midline suggestive of abscess with air-fluid level. A loop of small bowel   is draped around this collection however the origin may be from adjacent   diverticulitis in the proximal sigmoid colon.  Nonobstructive right renal calculus  Thickened and nodular adrenal glands suggests hyperplasia.   Mild thickening of the distal esophagus  Results were discussed with Dr. Chao at 3:10 PM on November 29, 2017                MIN HEAD M.D.,ATTENDING RADIOLOGIST  This document has been electronically signed. Nov 29 2017  3:11PM    < end of copied text >    Interpretation of Telemetry:  Not on Tele    Physical Exam:  Appearance: [x ] Normal  [ ] abnormal [x ] NAD   Eyes: [ x] PERRL [x ] EOMI  HENT: [x ] Normal [ ] Abnormal oral muscosa [ ]NC/AT  Cardiovascular: [ ] S1 [ ] S2 [ ] RRR [ ] m/r/g [ ]edema [ ] JVP  Procedural Access Site: [ ]  hematoma [ ] tender to palpation [ ] 2+ pulse [ ] bruit [ ] Ecchymosis  Respiratory: [ x] Clear to auscultation bilaterally  Gastrointestinal: [x ] Soft [ ] tenderness[ ] distension [x ] BS  Musculoskeletal: [ ] clubbing [ ] joint deformity   Neurologic: [x ] Non-focal  Lymphatic: [ ] lymphadenopathy  Psychiatry: [x ] AAOx3  [ ] confused [ ] disoriented [x ] Mood & affect appropriate  Skin: [ ]  rashes [ ] ecchymoses [ ] cyanosis

## 2017-12-03 NOTE — PROGRESS NOTE ADULT - SUBJECTIVE AND OBJECTIVE BOX
UCHE BE  MRN-[id number] 46y    GENERAL SURGERY/ DR. BEDOLLA    TOLERATING REGULAR DIET, + BM, NO N/V, NO FEVER    Vital Signs Last 24 Hrs  T(C): 36.3 (02 Dec 2017 22:27), Max: 36.8 (02 Dec 2017 13:18)  T(F): 97.4 (02 Dec 2017 22:27), Max: 98.2 (02 Dec 2017 13:18)  HR: 72 (02 Dec 2017 22:27) (72 - 81)  BP: 129/81 (02 Dec 2017 22:27) (121/79 - 129/81)  BP(mean): --  RR: 17 (02 Dec 2017 22:27) (16 - 17)  SpO2: 98% (02 Dec 2017 22:27) (98% - 98%)  DRAIN 15 CC SEROSANGUINOUS     ABDOMEN      DRAIN IN PLACE ONLY 15 CC SEROSANGUINOUS OUT PUT , SITE INTACT                        + BS, EXCEPT DRAIN SITE  SOFT, NON DISTENDED, NON- TENDER                         12.2   11.2  )-----------( 516      ( 02 Dec 2017 06:56 )             38.1      12-02    143  |  106  |  8   ----------------------------<  80  3.8   |  29  |  0.98    Ca    8.1<L>      02 Dec 2017 06:56  Mg     2.2     12-02    TPro  6.8  /  Alb  2.2<L>  /  TBili  0.2  /  DBili  x   /  AST  29  /  ALT  66  /  AlkPhos  67  12-02                              ASSESSMENT &  PLAN:  S/P CT GUIDED DRAINAGE PELVIC ABSCESS DAY # 4    AS PER ID ON ZOSYN   DRAIN CATH CHECK TOMORROW   ID F/U FOR POSSIBILITY  SWITCH TO PO ANTIBIOTICS AFTER DRAIN EVALUATION  MEDICAL, PULMONARY ,GI, CARDIOLOGY F/U NOTED    D/C PLAN

## 2017-12-03 NOTE — PROGRESS NOTE ADULT - ASSESSMENT
Assessment and Plan:   · Assessment		  46 M presents to [place] ED with 1 month history of worsening abdominal pain, with diverticulitis of large intestine with abscess, s/p abscess drainage, with episode of SVT during procedure, now back in SR. This episode of SVT presumably occurred in the setting of stress. There is also the history of recent drug use  Did not require any medications, and converted back to SR without adenosine.    - Maintaining SR  - Hold off on AVN blockers  - can remain off telemetry   - outpatient echo  - antibiotics per id  - abscess workup per id and gi  - Monitor and replete electrolytes. Keep K>4.0 and Mg>2.0.  - Further cardiac workup will depend on clinical course.   - All other workup per primary team. Will followup.   - D/C Planning as per primary team    Cecilia Martinez NP-C   Cardiology 46 M presents to [place] ED with 1 month history of worsening abdominal pain, with diverticulitis of large intestine with abscess, s/p abscess drainage, with episode of SVT during procedure, now back in SR. This episode of SVT presumably occurred in the setting of stress. There is also the history of recent drug use.  Did not require any medications, and converted back to SR without adenosine.    - Maintaining SR  - Hold off on AVN blockers  - can remain off telemetry   - outpatient echo  - antibiotics per id  - abscess workup per id and gi  - Monitor and replete electrolytes. Keep K>4.0 and Mg>2.0.  - Further cardiac workup will depend on clinical course.   - All other workup per primary team. Will followup.   - D/C Planning as per primary team    Cecilia Martinez NP-C   Cardiology

## 2017-12-03 NOTE — PROGRESS NOTE ADULT - SUBJECTIVE AND OBJECTIVE BOX
Date/Time Patient Seen:  		  Referring MD:   Data Reviewed	       Patient is a 46y old  Male who presents with a chief complaint of Abdominal pain for one month. nausea, vomiting, fever since yesterday (29 Nov 2017 22:54)  in bed  seen and examined  on room air  on ABX      Subjective/HPI     PAST MEDICAL & SURGICAL HISTORY:  No pertinent past medical history  Arm laceration: repair of arm laceration 1995- tendon repair  S/P shoulder surgery: left        Medication list         MEDICATIONS  (STANDING):  pantoprazole  Injectable 40 milliGRAM(s) IV Push daily  piperacillin/tazobactam IVPB. 3.375 Gram(s) IV Intermittent every 8 hours    MEDICATIONS  (PRN):  acetaminophen   Tablet 650 milliGRAM(s) Oral every 6 hours PRN For Temp greater than 38 C (100.4 F)  morphine  - Injectable 2 milliGRAM(s) IV Push every 4 hours PRN Moderate Pain (4 - 6)  morphine  - Injectable 4 milliGRAM(s) IV Push every 6 hours PRN Severe Pain (7 - 10)  nicotine - 21 mG/24Hr(s) Patch 1 patch Transdermal daily PRN nicotine w/d sx  ondansetron Injectable 4 milliGRAM(s) IV Push every 8 hours PRN Nausea and/or Vomiting         Vitals log        ICU Vital Signs Last 24 Hrs  T(C): 36.8 (03 Dec 2017 05:59), Max: 36.8 (02 Dec 2017 13:18)  T(F): 98.3 (03 Dec 2017 05:59), Max: 98.3 (03 Dec 2017 05:59)  HR: 73 (03 Dec 2017 05:59) (72 - 81)  BP: 139/89 (03 Dec 2017 05:59) (121/79 - 139/89)  BP(mean): --  ABP: --  ABP(mean): --  RR: 18 (03 Dec 2017 05:59) (16 - 18)  SpO2: 97% (03 Dec 2017 05:59) (97% - 98%)           Input and Output:  I&O's Detail    02 Dec 2017 07:01  -  03 Dec 2017 07:00  --------------------------------------------------------  IN:    Oral Fluid: 500 mL    Solution: 200 mL  Total IN: 700 mL    OUT:  Total OUT: 0 mL    Total NET: 700 mL          Lab Data                        12.2   11.2  )-----------( 516      ( 02 Dec 2017 06:56 )             38.1     12-02    143  |  106  |  8   ----------------------------<  80  3.8   |  29  |  0.98    Ca    8.1<L>      02 Dec 2017 06:56  Mg     2.2     12-02    TPro  6.8  /  Alb  2.2<L>  /  TBili  0.2  /  DBili  x   /  AST  29  /  ALT  66  /  AlkPhos  67  12-02            Review of Systems	      Objective     Physical Examination    head at  heart s1s2  lungs dec BS  abd soft      Pertinent Lab findings & Imaging      Elvie:  NO   Adequate UO     I&O's Detail    02 Dec 2017 07:01  -  03 Dec 2017 07:00  --------------------------------------------------------  IN:    Oral Fluid: 500 mL    Solution: 200 mL  Total IN: 700 mL    OUT:  Total OUT: 0 mL    Total NET: 700 mL               Discussed with:     Cultures:	        Radiology

## 2017-12-04 ENCOUNTER — TRANSCRIPTION ENCOUNTER (OUTPATIENT)
Age: 47
End: 2017-12-04

## 2017-12-04 VITALS
HEART RATE: 85 BPM | OXYGEN SATURATION: 97 % | TEMPERATURE: 98 F | SYSTOLIC BLOOD PRESSURE: 127 MMHG | DIASTOLIC BLOOD PRESSURE: 78 MMHG | RESPIRATION RATE: 17 BRPM

## 2017-12-04 LAB
AUTO DIFF PNL BLD: NEGATIVE — SIGNIFICANT CHANGE UP
C-ANCA SER-ACNC: NEGATIVE — SIGNIFICANT CHANGE UP
CULTURE RESULTS: SIGNIFICANT CHANGE UP
CULTURE RESULTS: SIGNIFICANT CHANGE UP
P-ANCA SER-ACNC: NEGATIVE — SIGNIFICANT CHANGE UP
SPECIMEN SOURCE: SIGNIFICANT CHANGE UP
SPECIMEN SOURCE: SIGNIFICANT CHANGE UP

## 2017-12-04 PROCEDURE — 85027 COMPLETE CBC AUTOMATED: CPT

## 2017-12-04 PROCEDURE — 80053 COMPREHEN METABOLIC PANEL: CPT

## 2017-12-04 PROCEDURE — 49424 ASSESS CYST CONTRAST INJECT: CPT | Mod: 78

## 2017-12-04 PROCEDURE — 83520 IMMUNOASSAY QUANT NOS NONAB: CPT

## 2017-12-04 PROCEDURE — 87040 BLOOD CULTURE FOR BACTERIA: CPT

## 2017-12-04 PROCEDURE — 86708 HEPATITIS A ANTIBODY: CPT

## 2017-12-04 PROCEDURE — 84436 ASSAY OF TOTAL THYROXINE: CPT

## 2017-12-04 PROCEDURE — 86704 HEP B CORE ANTIBODY TOTAL: CPT

## 2017-12-04 PROCEDURE — 86140 C-REACTIVE PROTEIN: CPT

## 2017-12-04 PROCEDURE — 74176 CT ABD & PELVIS W/O CONTRAST: CPT

## 2017-12-04 PROCEDURE — 83605 ASSAY OF LACTIC ACID: CPT

## 2017-12-04 PROCEDURE — 76000 FLUOROSCOPY <1 HR PHYS/QHP: CPT

## 2017-12-04 PROCEDURE — 85652 RBC SED RATE AUTOMATED: CPT

## 2017-12-04 PROCEDURE — 96375 TX/PRO/DX INJ NEW DRUG ADDON: CPT

## 2017-12-04 PROCEDURE — 84480 ASSAY TRIIODOTHYRONINE (T3): CPT

## 2017-12-04 PROCEDURE — 83690 ASSAY OF LIPASE: CPT

## 2017-12-04 PROCEDURE — 76080 X-RAY EXAM OF FISTULA: CPT | Mod: 26

## 2017-12-04 PROCEDURE — 72192 CT PELVIS W/O DYE: CPT

## 2017-12-04 PROCEDURE — 86803 HEPATITIS C AB TEST: CPT

## 2017-12-04 PROCEDURE — 87070 CULTURE OTHR SPECIMN AEROBIC: CPT

## 2017-12-04 PROCEDURE — 84443 ASSAY THYROID STIM HORMONE: CPT

## 2017-12-04 PROCEDURE — C1894: CPT

## 2017-12-04 PROCEDURE — 87186 SC STD MICRODIL/AGAR DIL: CPT

## 2017-12-04 PROCEDURE — 87205 SMEAR GRAM STAIN: CPT

## 2017-12-04 PROCEDURE — 99285 EMERGENCY DEPT VISIT HI MDM: CPT | Mod: 25

## 2017-12-04 PROCEDURE — 81001 URINALYSIS AUTO W/SCOPE: CPT

## 2017-12-04 PROCEDURE — 72192 CT PELVIS W/O DYE: CPT | Mod: 26

## 2017-12-04 PROCEDURE — 87086 URINE CULTURE/COLONY COUNT: CPT

## 2017-12-04 PROCEDURE — 96376 TX/PRO/DX INJ SAME DRUG ADON: CPT

## 2017-12-04 PROCEDURE — 99232 SBSQ HOSP IP/OBS MODERATE 35: CPT

## 2017-12-04 PROCEDURE — 96374 THER/PROPH/DIAG INJ IV PUSH: CPT

## 2017-12-04 PROCEDURE — 82150 ASSAY OF AMYLASE: CPT

## 2017-12-04 PROCEDURE — 80307 DRUG TEST PRSMV CHEM ANLYZR: CPT

## 2017-12-04 PROCEDURE — 87521 HEPATITIS C PROBE&RVRS TRNSC: CPT

## 2017-12-04 PROCEDURE — 86706 HEP B SURFACE ANTIBODY: CPT

## 2017-12-04 PROCEDURE — 84145 PROCALCITONIN (PCT): CPT

## 2017-12-04 PROCEDURE — 87340 HEPATITIS B SURFACE AG IA: CPT

## 2017-12-04 PROCEDURE — 77012 CT SCAN FOR NEEDLE BIOPSY: CPT

## 2017-12-04 PROCEDURE — 80076 HEPATIC FUNCTION PANEL: CPT

## 2017-12-04 PROCEDURE — 80048 BASIC METABOLIC PNL TOTAL CA: CPT

## 2017-12-04 PROCEDURE — C1769: CPT

## 2017-12-04 PROCEDURE — 76080 X-RAY EXAM OF FISTULA: CPT

## 2017-12-04 PROCEDURE — 86705 HEP B CORE ANTIBODY IGM: CPT

## 2017-12-04 PROCEDURE — 83735 ASSAY OF MAGNESIUM: CPT

## 2017-12-04 PROCEDURE — 86709 HEPATITIS A IGM ANTIBODY: CPT

## 2017-12-04 PROCEDURE — C1729: CPT

## 2017-12-04 PROCEDURE — 87075 CULTR BACTERIA EXCEPT BLOOD: CPT

## 2017-12-04 PROCEDURE — 10160 PNXR ASPIR ABSC HMTMA BULLA: CPT

## 2017-12-04 PROCEDURE — 84100 ASSAY OF PHOSPHORUS: CPT

## 2017-12-04 PROCEDURE — 86036 ANCA SCREEN EACH ANTIBODY: CPT

## 2017-12-04 RX ORDER — CEFUROXIME AXETIL 250 MG
1 TABLET ORAL
Qty: 14 | Refills: 0 | OUTPATIENT
Start: 2017-12-04 | End: 2017-12-11

## 2017-12-04 RX ORDER — METRONIDAZOLE 500 MG
1 TABLET ORAL
Qty: 21 | Refills: 0 | OUTPATIENT
Start: 2017-12-04 | End: 2017-12-11

## 2017-12-04 RX ADMIN — PANTOPRAZOLE SODIUM 40 MILLIGRAM(S): 20 TABLET, DELAYED RELEASE ORAL at 12:47

## 2017-12-04 RX ADMIN — PIPERACILLIN AND TAZOBACTAM 25 GRAM(S): 4; .5 INJECTION, POWDER, LYOPHILIZED, FOR SOLUTION INTRAVENOUS at 13:52

## 2017-12-04 RX ADMIN — MORPHINE SULFATE 4 MILLIGRAM(S): 50 CAPSULE, EXTENDED RELEASE ORAL at 16:22

## 2017-12-04 RX ADMIN — PIPERACILLIN AND TAZOBACTAM 25 GRAM(S): 4; .5 INJECTION, POWDER, LYOPHILIZED, FOR SOLUTION INTRAVENOUS at 07:01

## 2017-12-04 RX ADMIN — MORPHINE SULFATE 4 MILLIGRAM(S): 50 CAPSULE, EXTENDED RELEASE ORAL at 00:01

## 2017-12-04 RX ADMIN — MORPHINE SULFATE 4 MILLIGRAM(S): 50 CAPSULE, EXTENDED RELEASE ORAL at 10:40

## 2017-12-04 RX ADMIN — MORPHINE SULFATE 4 MILLIGRAM(S): 50 CAPSULE, EXTENDED RELEASE ORAL at 10:12

## 2017-12-04 NOTE — PROGRESS NOTE ADULT - PROBLEM SELECTOR PROBLEM 5
SVT (supraventricular tachycardia)
Abdominal abscess
Substance abuse
SVT (supraventricular tachycardia)
Substance abuse

## 2017-12-04 NOTE — PROGRESS NOTE ADULT - PROBLEM SELECTOR PLAN 6
on ABX  IR drain in  ID and Surgery to follow  dc planning
Nicotine patch  Counseled on cessation
svt episode  cardio following  on remote tele  replete savanah
Nicotine patch  Counseled on cessation

## 2017-12-04 NOTE — DISCHARGE NOTE ADULT - CARE PLAN
Principal Discharge DX:	Abdominal abscess  Goal:	Wound healing, analgesia, take antibiotics as directed  Instructions for follow-up, activity and diet:	Call Dr. Tan's  (Surgeon) office for an appointment for follow up.  Call Dr. Kumar's (Medicine) office for an appointment for follow up  Call Dr. Jean' (Infectious disease) office for an appointment for follow up. Principal Discharge DX:	Abdominal abscess  Goal:	Wound healing, analgesia, take antibiotics as directed  Instructions for follow-up, activity and diet:	Call Dr. Tan's  (Surgeon) office for an appointment for follow up.  Call Dr. Kumar's (Medicine) office for an appointment for follow up  Call Dr. Escobedo (GI) for follow up.

## 2017-12-04 NOTE — PROGRESS NOTE ADULT - PROBLEM SELECTOR PROBLEM 3
Atelectasis
Diverticula of intestine
EDWAR (acute kidney injury)
SVT (supraventricular tachycardia)
Elevated liver enzymes
EDWAR (acute kidney injury)

## 2017-12-04 NOTE — DISCHARGE NOTE ADULT - NS AS ACTIVITY OBS
Do not drive or operate machinery/Showering allowed/No Heavy lifting/straining/Showering allowed starting tomorrow/Do not make important decisions Walking-Outdoors allowed/Walking-Indoors allowed/Showering allowed/Showering allowed starting tomorrow but leave dressing in place. Remove dressing after 48 hrs. Apply band-aid or dry dressing to site if draining./Do not drive or operate machinery/Do not make important decisions/No Heavy lifting/straining

## 2017-12-04 NOTE — PROGRESS NOTE ADULT - ASSESSMENT
46 M presents to [place] ED with 1 month history of worsening abdominal pain, with diverticulitis of large intestine with abscess, s/p abscess drainage, with episode of SVT during procedure, now back in SR. This episode of SVT presumably occurred in the setting of stress. There is also the history of recent drug use.  Did not require any medications, and converted back to SR without adenosine.    - Maintaining SR  - Hold off on AVN blockers  - can remain off telemetry   - outpatient echo  - antibiotics per id  - abscess workup per id and gi  - Monitor and replete electrolytes. Keep K>4.0 and Mg>2.0.  - Further cardiac workup will depend on clinical course.   - All other workup per primary team. Will followup.   - D/C Planning as per primary team

## 2017-12-04 NOTE — PROGRESS NOTE ADULT - PROBLEM SELECTOR PLAN 4
Improved  GI f/u
ANKIT  u tox noted  counseling provided
NRT  smoking cess ed  counseling
on ABX  cx and sx noted  pain control  monitor labs  overall better  dec IVF rate  mobilize
I edouard  monitor sat  keep sat > 88 pct
Improved  GI f/u

## 2017-12-04 NOTE — PROGRESS NOTE ADULT - ASSESSMENT
Likely diverticular abscess  ANCA ASCA negative, does not exclude IBD but not suggestive of it.  Will need colonoscopy in the future to exclude occult lesion  Discussed with patient in detail    Suggestions--  F/U with GI  F/U with surgery  Ceftin 500mg PO Q12H plus Flagyl 500mg PO Q8H for 7 days  Reviewed AE with patient, all questions answered  Left message for REGINA Morales  Thank you for the courtesy of this referral.  I'll sign off at this time.     Jorje Jean MD  [phone]

## 2017-12-04 NOTE — DISCHARGE NOTE ADULT - CARE PROVIDER_API CALL
Andres Tan), Surgery  [address] [place] [address]  Phone: [phone]  Fax: [phone]

## 2017-12-04 NOTE — PROGRESS NOTE ADULT - PROBLEM SELECTOR PLAN 2
weakly positive HCV antibody   check HCV viral load  monitor liver enzymes
abd abscess  on ABX  gram stain from IR drain noted, on Zosyn  may need ID eval  full report pending
cardio following  no further episodes  outpatient follow up
weakly positive HCV antibody   check HCV viral load  monitor liver enzymes
weakly positive HCV antibody   check HCV viral load  monitor liver enzymes
weakly positive HCV antibody   f/u  HCV viral load  monitor liver enzymes
Await PCR  Even if +, no acute intervention
GI consult  check ASCA/ANCA to screen for Crohns  Trend labs.
GI consult  check ASCA/ANCA to screen for Crohns  Trend labs.
GI f/u  ASCA/ANCA to screen for Crohns negative  outpatient colonoscopy
resolved  off IVF  enc PO intake
EDWAR  resolved  IVF  monitor labs  replete lytes
GI consult  check ASCA/ANCA to screen for Crohns  Trend labs.

## 2017-12-04 NOTE — PROGRESS NOTE ADULT - PROBLEM SELECTOR PROBLEM 1
EDWAR (acute kidney injury)
Abdominal abscess
Cigarette nicotine dependence without complication
Abdominal abscess
Cigarette nicotine dependence without complication
Abdominal abscess
Cigarette nicotine dependence without complication
Abdominal abscess

## 2017-12-04 NOTE — PROGRESS NOTE ADULT - PROBLEM SELECTOR PLAN 3
monitor LABS  hepatitis profile noted
I edouard  mobilize  ambulate  pain control
GI follow up  surgery following  monitor sx
Improved with IVF
Improved with IVF  d/c IVF
Improving with IVF  Continue IVF  Trend labs.
no further episodes  cardio following
Improved with IVF  d/c IVF

## 2017-12-04 NOTE — PROGRESS NOTE ADULT - SUBJECTIVE AND OBJECTIVE BOX
[facility] -- Joyce Wade, Alice, Michaela, Matti Walker Savella  Office # [phone]      Follow Up:  SVT    Subjective/Observations: Patient seen and examined. Events noted. Resting comfortably in bed. No complaints of chest pain, dyspnea, or palpitations reported.       REVIEW OF SYSTEMS: All other review of systems is negative unless indicated above    PAST MEDICAL & SURGICAL HISTORY:  No pertinent past medical history  Arm laceration: repair of arm laceration 1995- tendon repair  S/P shoulder surgery: left      MEDICATIONS  (STANDING):  pantoprazole  Injectable 40 milliGRAM(s) IV Push daily  piperacillin/tazobactam IVPB. 3.375 Gram(s) IV Intermittent every 8 hours    MEDICATIONS  (PRN):  acetaminophen   Tablet 650 milliGRAM(s) Oral every 6 hours PRN For Temp greater than 38 C (100.4 F)  morphine  - Injectable 2 milliGRAM(s) IV Push every 4 hours PRN Moderate Pain (4 - 6)  morphine  - Injectable 4 milliGRAM(s) IV Push every 6 hours PRN Severe Pain (7 - 10)  nicotine - 21 mG/24Hr(s) Patch 1 patch Transdermal daily PRN nicotine w/d sx  ondansetron Injectable 4 milliGRAM(s) IV Push every 8 hours PRN Nausea and/or Vomiting      Allergies    No Known Allergies    Intolerances            Vital Signs Last 24 Hrs  T(C): 36.9 (04 Dec 2017 14:02), Max: 37 (04 Dec 2017 11:53)  T(F): 98.5 (04 Dec 2017 14:02), Max: 98.6 (04 Dec 2017 11:53)  HR: 85 (04 Dec 2017 14:02) (74 - 85)  BP: 127/78 (04 Dec 2017 14:02) (117/72 - 135/86)  BP(mean): --  RR: 17 (04 Dec 2017 14:02) (17 - 18)  SpO2: 97% (04 Dec 2017 14:02) (97% - 99%)    I&O's Summary    03 Dec 2017 07:01  -  04 Dec 2017 07:00  --------------------------------------------------------  IN: 300 mL / OUT: 0 mL / NET: 300 mL    04 Dec 2017 07:01  -  04 Dec 2017 14:49  --------------------------------------------------------  IN: 25 mL / OUT: 0 mL / NET: 25 mL          PHYSICAL EXAM:     Constitutional: NAD, awake and alert, well-developed  HEENT: Moist Mucous Membranes, Anicteric  Pulmonary: Non-labored, breath sounds are clear bilaterally, No wheezing, rales or rhonchi  Cardiovascular: Regular, S1 and S2, No murmurs, rubs, gallops or clicks  Gastrointestinal: Bowel Sounds present, soft, nontender.   Lymph: No peripheral edema. No lymphadenopathy.  Skin: No visible rashes or ulcers.  Psych:  Mood & affect appropriate    LABS: All Labs Reviewed:                        12.2   11.2  )-----------( 516      ( 02 Dec 2017 06:56 )             38.1     02 Dec 2017 06:56    143    |  106    |  8      ----------------------------<  80     3.8     |  29     |  0.98     Ca    8.1        02 Dec 2017 06:56  Mg     2.2       02 Dec 2017 06:56    TPro  6.8    /  Alb  2.2    /  TBili  0.2    /  DBili  x      /  AST  29     /  ALT  66     /  AlkPhos  67     02 Dec 2017 06:56

## 2017-12-04 NOTE — PROGRESS NOTE ADULT - PROBLEM SELECTOR PROBLEM 6
Cigarette nicotine dependence without complication
SVT (supraventricular tachycardia)
Abdominal abscess
Cigarette nicotine dependence without complication

## 2017-12-04 NOTE — PROGRESS NOTE ADULT - SUBJECTIVE AND OBJECTIVE BOX
[facility], Division of Infectious Diseases  EMANUEL Prado A. Lee    Name: UCHE BE  Age: 46y  Gender: Male  MRN: [id number]    Interval History--  Notes reviewed. Feels back to baseline. Drain removed.     Past Medical History--  No pertinent past medical history  Arm laceration  S/P shoulder surgery      For details regarding the patient's social history, family history, and other miscellaneous elements, please refer the initial infectious diseases consultation and/or the admitting history and physical examination for this admission.    Allergies    No Known Allergies    Intolerances        Medications--  Antibiotics:  piperacillin/tazobactam IVPB. 3.375 Gram(s) IV Intermittent every 8 hours    Immunologic:    Other:  acetaminophen   Tablet PRN  morphine  - Injectable PRN  morphine  - Injectable PRN  nicotine - 21 mG/24Hr(s) Patch PRN  ondansetron Injectable PRN  pantoprazole  Injectable      Review of Systems--  A 10-point review of systems was obtained.   Review of systems otherwise negative except as previously noted.    Physical Examination--  Vital Signs: T(F): 98.5 (12-04-17 @ 14:02), Max: 98.6 (12-04-17 @ 11:53)  HR: 85 (12-04-17 @ 14:02)  BP: 127/78 (12-04-17 @ 14:02)  RR: 17 (12-04-17 @ 14:02)  SpO2: 97% (12-04-17 @ 14:02)  Wt(kg): --  General: Nontoxic-appearing Male in no acute distress.  HEENT: AT/NC. PERRL. EOMI. Anicteric. Conjunctiva pink and moist. Oropharynx clear. Dentition fair.  Neck: Not rigid. No sense of mass.  Nodes: None palpable.  Lungs: Clear bilaterally without rales, wheezing or rhonchi  Heart: Regular rate and rhythm. No Murmur. No rub. No gallop. No palpable thrill.  Abdomen: Bowel sounds present and normoactive. Soft. Nondistended. Nontender. No sense of mass. No organomegaly.  Back: No spinal tenderness. No costovertebral angle tenderness.   Extremities: No cyanosis or clubbing. No edema.   Skin: Warm. Dry. Good turgor. No rash. No vasculitic stigmata.  Psychiatric: Appropriate affect and mood for situation.         Laboratory Studies--  WBC Count: 11.2 K/uL (12.02.17 @ 06:56)  Creatinine, Serum: 0.98 mg/dL (12.02.17 @ 06:56)    ASCA & ANCA negative    Culture Data  Culture - Body Fluid with Gram Stain (11.29.17 @ 21:24)    Gram Stain:   Numerous polymorphonuclear leukocytes per low power field  Numerous Gram Positive Cocci in Pairs and Chains per oil power field  Numerous Gram Negative Rods per oil power field    -  Amikacin: S <=8    -  Ampicillin: R >16    -  Ampicillin/Sulbactam: R >16/8    -  Aztreonam: S <=4    -  Cefazolin: S 8    -  Cefepime: S <=2    -  Cefoxitin: S <=4    -  Ceftazidime: S <=1    -  Ceftriaxone: S <=1    -  Ciprofloxacin: S <=0.5    -  Ertapenem: S <=0.5    -  Gentamicin: S 2    -  Imipenem: S <=1    -  Levofloxacin: S <=1    -  Meropenem: S <=1    -  Piperacillin/Tazobactam: S <=8    -  Tobramycin: S <=2    -  Trimethoprim/Sulfamethoxazole: R >2/38    Specimen Source: Abdominal Fl Abdominal Fluid    Culture Results:   Numerous Escherichia coli  Numerous Streptococcus anginosus  Few Candida albicans  Moderate Peptostreptococcus anaerobius    Organism Identification: Escherichia coli    Organism: Escherichia coli    Method Type: TATIANA    < from: IR Fluoroscopy <1 Hour (12.04.17 @ 11:50) >  PROCEDURE DATE:  12/04/2017          INTERPRETATION:  Pelvic tube check    History: Status post drainage of diverticular abscess. Output from the   tube has recently been low. Patient presents to see if there is   medication between the abscess catheter he and bowel. CT of the pelvis   obtained before this procedure shows collapse of the original abscess   cavity.    Procedure: The patient was brought to the interventional suite and placed   in a supine position. His existing drainage catheter was prepped and   draped in usual sterile fashion. Contrast material was injectedinto it   by hand until spillage was identified at the skin. The patient was then   rotated to a right-side-down decubitus position and then returned to a   supine position for final fluoroscopic imaging.    The catheter was then removed and a sterile dressing was applied.    The patient tolerated the procedure well and was discharged from the   interventional suite in stable condition.    Interpretation: Contrast material opacified and oval-shaped space and   there was no communication with adjacent bowel. This collection   configuration was not altered with patient position change.    Thank you for the courtesy of this referral.    < end of copied text >

## 2017-12-04 NOTE — PROGRESS NOTE ADULT - PROBLEM SELECTOR PROBLEM 4
Elevated liver enzymes
Substance abuse
Nicotine addiction
Abdominal abscess
Atelectasis
Elevated liver enzymes

## 2017-12-04 NOTE — PROGRESS NOTE ADULT - PROBLEM SELECTOR PROBLEM 2
Elevated liver enzymes
Diverticula of intestine
Elevated liver enzymes
SVT (supraventricular tachycardia)
Diverticula of intestine
EDWAR (acute kidney injury)
R/O Hepatitis C
EDWAR (acute kidney injury)
Diverticula of intestine

## 2017-12-04 NOTE — DISCHARGE NOTE ADULT - PATIENT PORTAL LINK FT
“You can access the Follow[employer] Patient Portal, offered by [facility], by registering with the following website: [email or web address]”

## 2017-12-04 NOTE — PROGRESS NOTE ADULT - PROBLEM SELECTOR PLAN 5
Had run of SVT in ER  Has been in NSR  Cardiac monitor  Cardiology f/u  ECHO.
pain relief  ABX  ID following  blood cx noted, neg  am WBC pending  supportive care  IR drain monitoring  surgery following
cocaine use and marijuana use  counseling  u tox pending
Had run of SVT in ER  Cardiac monitor  Cardiology f/u  ECHO.
Had run of SVT in ER  Has been in NSR    Cardiology f/u  ECHO.
Had run of SVT in ER  Has been in NSR  Cardiac monitor  Cardiology f/u  ECHO.
u tox reviewed with pt  counseling

## 2017-12-04 NOTE — BRIEF OPERATIVE NOTE - POST-OP DX
Diverticulitis of large intestine with abscess without bleeding  11/29/2017    Active  Anshu Mas
Abscess  12/04/2017    Anshu Bautista  Diverticulitis of large intestine with abscess without bleeding  11/29/2017    Anshu Bautista

## 2017-12-04 NOTE — DISCHARGE NOTE ADULT - PLAN OF CARE
Call Dr. Tan's  (Surgeon) office for an appointment for follow up.  Call Dr. Kumar's (Medicine) office for an appointment for follow up  Call Dr. Jean' (Infectious disease) office for an appointment for follow up. Wound healing, analgesia, take antibiotics as directed Call Dr. Tan's  (Surgeon) office for an appointment for follow up.  Call Dr. Kumar's (Medicine) office for an appointment for follow up  Call Dr. Escobedo (GI) for follow up.

## 2017-12-04 NOTE — PROGRESS NOTE ADULT - PROBLEM SELECTOR PLAN 7
Utox noted  No evidence of withdrawal

## 2017-12-04 NOTE — PROGRESS NOTE ADULT - SUBJECTIVE AND OBJECTIVE BOX
UCHE BE  MRN-[id number] 46y    GENERAL SURGERY/ DR. BEDOLLA    TOLERATING REGULAR DIET AND HAVING BM, NO N/V, FEVER, CHILLS     Vital Signs Last 24 Hrs  T(C): 36.7 (04 Dec 2017 05:53), Max: 37.1 (03 Dec 2017 12:16)  T(F): 98 (04 Dec 2017 05:53), Max: 98.8 (03 Dec 2017 12:16)  HR: 74 (04 Dec 2017 05:53) (74 - 86)  BP: 135/86 (04 Dec 2017 05:53) (117/72 - 135/86)  BP(mean): --  RR: 18 (04 Dec 2017 05:53) (16 - 18)  SpO2: 99% (04 Dec 2017 05:53) (97% - 99%)    ABDOMEN      IR DRAIN IN PLACE , SITE INTACT , SOME HARDNESS AT THE CATH SITE                        + BS, SOFT, NON DISTENDED,  NON TENDER        Comprehensive Metabolic Panel in AM (12.02.17 @ 06:56)    Sodium, Serum: 143 mmol/L    Potassium, Serum: 3.8 mmol/L    Chloride, Serum: 106 mmol/L    Carbon Dioxide, Serum: 29 mmol/L    Anion Gap, Serum: 8 mmol/L    Blood Urea Nitrogen, Serum: 8 mg/dL    Creatinine, Serum: 0.98 mg/dL    Glucose, Serum: 80 mg/dL    Calcium, Total Serum: 8.1 mg/dL    Protein Total, Serum: 6.8 g/dL    Albumin, Serum: 2.2 g/dL    Bilirubin Total, Serum: 0.2 mg/dL    Alkaline Phosphatase, Serum: 67 U/L    Aspartate Aminotransferase (AST/SGOT): 29 U/L    Alanine Aminotransferase (ALT/SGPT): 66 U/L    Complete Blood Count in AM (12.02.17 @ 06:56)    WBC Count: 11.2 K/uL    RBC Count: 4.23 M/uL    Hemoglobin: 12.2 g/dL    Hematocrit: 38.1 %    Mean Cell Volume: 90.1 fl    Mean Cell Hemoglobin: 28.7 pg    Mean Cell Hemoglobin Conc: 31.9 gm/dL    Red Cell Distrib Width: 12.3 %    Platelet Count - Automated: 516 K/uL                          ASSESSMENT &  PLAN:  DAY # 5 S/P CT GUIDED DRAINAGE PELVIC ABSCESS    WILL DISCUSS ANS ARRANGE FOLLOW UP WITH RADIOLOGY DR. IBARRA THIS MORNING  ON ZOSYN DAY # 5, ID FOLLOW UP  MEDICAL, PULMONARY, CARDIOLOGY, GI F/U NOTED

## 2017-12-04 NOTE — PROGRESS NOTE ADULT - SUBJECTIVE AND OBJECTIVE BOX
Patient is a 46y old  Male who presents with a chief complaint of Abdominal pain for one month. nausea, vomiting, fever since yesterday (29 Nov 2017 22:54)      INTERVAL HPI/OVERNIGHT EVENTS: Patient seen and examined. NAD. No complaints.    Vital Signs Last 24 Hrs  T(C): 36.9 (04 Dec 2017 14:02), Max: 37 (04 Dec 2017 11:53)  T(F): 98.5 (04 Dec 2017 14:02), Max: 98.6 (04 Dec 2017 11:53)  HR: 85 (04 Dec 2017 14:02) (74 - 85)  BP: 127/78 (04 Dec 2017 14:02) (117/72 - 135/86)  BP(mean): --  RR: 17 (04 Dec 2017 14:02) (17 - 18)  SpO2: 97% (04 Dec 2017 14:02) (97% - 99%)              CAPILLARY BLOOD GLUCOSE                                  acetaminophen   Tablet 650 milliGRAM(s) Oral every 6 hours PRN  morphine  - Injectable 2 milliGRAM(s) IV Push every 4 hours PRN  morphine  - Injectable 4 milliGRAM(s) IV Push every 6 hours PRN  nicotine - 21 mG/24Hr(s) Patch 1 patch Transdermal daily PRN  ondansetron Injectable 4 milliGRAM(s) IV Push every 8 hours PRN  pantoprazole  Injectable 40 milliGRAM(s) IV Push daily  piperacillin/tazobactam IVPB. 3.375 Gram(s) IV Intermittent every 8 hours      REVIEW OF SYSTEMS:  CONSTITUTIONAL: No fever, no weight loss, or no fatigue  NECK: No pain, no stiffness  RESPIRATORY: No cough, no wheezing, no chills, no hemoptysis, No shortness of breath  CARDIOVASCULAR: No chest pain, no palpitations, no dizziness, no leg swelling  GASTROINTESTINAL: No abdominal pain. No nausea, no vomiting, no hematemesis; No diarrhea, no constipation. No melena, no hematochezia.  GENITOURINARY: No dysuria, no frequency, no hematuria, no incontinence  NEUROLOGICAL: No headaches, no loss of strength, no numbness, no tremors  SKIN: No itching, no burning  MUSCULOSKELETAL: No joint pain, no swelling; No muscle, no back, no extremity pain  PSYCHIATRIC: No depression, no mood swings,   HEME/LYMPH: No easy bruising, no bleeding gums  ALLERY AND IMMUNOLOGIC: No hives       Consultant(s) Notes Reviewed:  [ x] YES  [ ] NO    PHYSICAL EXAM:  GENERAL: NAD  HEAD:  Atraumatic, Normocephalic  EYES: EOMI, PERRLA, conjunctiva and sclera clear  ENMT: No tonsillar erythema, exudates, or enlargement; Moist mucous membranes  NECK: Supple, No JVD  NERVOUS SYSTEM:  Awake & alert  CHEST/LUNG: Clear to auscultation bilaterally; No rales, rhonchi, wheezing,  HEART: Regular rate and rhythm  ABDOMEN: Soft, Nontender, Nondistended; Bowel sounds present  EXTREMITIES:  No clubbing, cyanosis, or edema  LYMPH: No lymphadenopathy noted  SKIN: No rashes      Advanced care planning discussed with patient/family [ x] YES   [ ] NO

## 2017-12-04 NOTE — DISCHARGE NOTE ADULT - MEDICATION SUMMARY - MEDICATIONS TO TAKE
I will START or STAY ON the medications listed below when I get home from the hospital:    Tylenol 325 mg oral tablet  -- 2 tab(s) by mouth every 4 hours as needed for pain.  -- Indication: For Pain med    Xanax 2 mg oral tablet  -- 1 tab(s) by mouth 3 times a day as needed for anxiety/pain.  -- Indication: For Home med I will START or STAY ON the medications listed below when I get home from the hospital:    Flagyl 500 mg oral tablet  -- 1 tab(s) by mouth 3 times a day   -- Do not drink alcoholic beverages when taking this medication.  Finish all this medication unless otherwise directed by prescriber.  May discolor urine or feces.    -- Indication: For Antibiotic    Tylenol 325 mg oral tablet  -- 2 tab(s) by mouth every 4 hours as needed for pain.  -- Indication: For Pain med    Xanax 2 mg oral tablet  -- 1 tab(s) by mouth 3 times a day as needed for anxiety/pain.  -- Indication: For Home med    cefuroxime 500 mg oral tablet  -- 1 tab(s) by mouth every 12 hours   -- Finish all this medication unless otherwise directed by prescriber.  Medication should be taken with plenty of water.  Take with food or milk.    -- Indication: For Antibiotic

## 2017-12-04 NOTE — DISCHARGE NOTE ADULT - HOSPITAL COURSE
46 Y.O. M presented to [place] ED on 11/29/17 with 1 month history of worsening abdominal pain with associated fevers, nausea and vomiting. Pt underwent CT Abd/Pelvis which revealed Large thick walled collection in the lower abdomen in the midline suggestive of abscess with air-fluid level. Pt was admitted and underwent CT guided aspiration of collection and developed an episode of SVT during the procedure. Pt reverted back to NSR spontaneously without medication. Urine toxicology was (+) for Benzodiazepines (pt on takes xanax), cocaine and opiates (pt given morphine upon admission). Pt was seen by Cardiology and event noted to be likely related to stress. Pt was monitored on Telemetry unit throughout hospital stay with no recurrence. Pt had in-dwelling drainage catheter place at time of aspiration procedure. Pt was placed on IV Zosyn. Pt was seen and follow by Surgery, Medicine, GI, Pulmonary, Cardiology and Infectious disease throughout hospital stay. Pt had EDWAR on admission with creatinine elevated to 1.9 which resolved with IVF and oral intake to 0.98 at time of discharge. Pt was also found to have elevated liver enzymes and Hepatitis serologies were sent which revealed weakly positive Hep C virus however Hep C RNA qualitative was negative. Pt also endorsed history of marijuana, cocaine and tobacco use and patient was placed on nicotine patch and counseled on cessation of street drugs. CT aspiration cultures were positive for E. Coli, Streptococcus anginosus, Peptostreptococcus and few candida. Collection was likely representative of Diverticulitis vs. Crohn's. On admission day # 5, Pt underwent repeat CT guided study of pelvis which was found to have no evidence of communication or fistula with adjacent bowel and collapse of previous abscess. Pt stable for discharge home from surgical standpoint and likely necessitating colectomy in ~6 weeks. Pt to follow up with ID, Cardiology for outpatient Echo, GI for colonoscopy, and Surgery. 46 Y.O. M presented to [place] ED on 11/29/17 with 1 month history of worsening abdominal pain with associated fevers, nausea and vomiting. Pt underwent CT Abd/Pelvis which revealed Large thick walled collection in the lower abdomen in the midline suggestive of abscess with air-fluid level. Pt was admitted and underwent CT guided aspiration of collection and developed an episode of SVT during the procedure. Pt reverted back to NSR spontaneously without medication. Urine toxicology was (+) for Benzodiazepines (pt on takes xanax), cocaine and opiates (pt given morphine for painupon admission). Pt was seen by Cardiology and event noted to be likely related to stress. Pt was monitored on Telemetry unit throughout hospital stay with no recurrence. Pt had in-dwelling drainage catheter place at time of aspiration procedure. Pt was placed on IV Zosyn. Pt was seen and follow by Surgery, Medicine, GI, Pulmonary, Cardiology and Infectious disease throughout hospital stay. Pt had EDWAR on admission with creatinine elevated to 1.9 which resolved with IVF and oral intake to 0.98 at time of discharge. Pt was also found to have elevated liver enzymes and Hepatitis serologies were sent which revealed weakly positive Hep C virus however Hep C RNA qualitative was negative. Pt also endorsed history of marijuana, cocaine and tobacco use and patient was placed on nicotine patch and counseled on cessation of street drugs. CT aspiration cultures were positive for E. Coli, Streptococcus anginosus, Peptostreptococcus and few candida. Blood cultures and urine cultures negative to date. Collection was likely representative of Diverticulitis vs. Crohn's. On admission day # 5, Pt underwent repeat CT guided study of pelvis which was found to have no evidence of communication or fistula with adjacent bowel and collapse of previous abscess. Pt stable for discharge home from surgical standpoint and likely necessitating colectomy in ~6 weeks. Pt to be placed on oral antibiotic regimen for abscess per ID. Pt to follow up with ID, Cardiology for outpatient Echo, GI for colonoscopy, and Surgery. 46 Y.O. M presented to [place] ED on 11/29/17 with 1 month history of worsening abdominal pain with associated fevers, nausea and vomiting. Pt underwent CT Abd/Pelvis which revealed Large thick walled collection in the lower abdomen in the midline suggestive of abscess with air-fluid level. Pt was admitted and underwent CT guided aspiration of collection and developed an episode of SVT during the procedure. Pt reverted back to NSR spontaneously without medication. Urine toxicology was (+) for Benzodiazepines (pt on takes xanax), cocaine and opiates (pt given morphine for painupon admission). Pt was seen by Cardiology and event noted to be likely related to stress. Pt was monitored on Telemetry unit throughout hospital stay with no recurrence. Pt had in-dwelling drainage catheter place at time of aspiration procedure. Pt was placed on IV Zosyn. Pt was seen and follow by Surgery, Medicine, GI, Pulmonary, Cardiology and Infectious disease throughout hospital stay. Pt had EDWAR on admission with creatinine elevated to 1.9 which resolved with IVF and oral intake to 0.98 at time of discharge. Pt was also found to have elevated liver enzymes and Hepatitis serologies were sent which revealed weakly positive Hep C virus however Hep C RNA qualitative was negative. Pt also endorsed history of marijuana, cocaine and tobacco use and patient was placed on nicotine patch and counseled on cessation of street drugs. CT aspiration cultures were positive for E. Coli, Streptococcus anginosus, Peptostreptococcus and few candida. Blood cultures and urine cultures negative to date. Collection was likely representative of Diverticulitis vs. Crohn's. On admission day # 5, Pt underwent repeat CT guided study of pelvis which was found to have no evidence of communication or fistula with adjacent bowel and collapse of previous abscess. Pt stable for discharge home from surgical standpoint and likely necessitating colectomy in ~6 weeks. Pt to be placed on oral antibiotic regimen for abscess per ID. Pt to follow up with ID, Cardiology for outpatient Echo, GI for colonoscopy, and Surgery.   12/07/17 addendum Pt was septic on admission and was treated for sepsis with antibotics and drainage

## 2017-12-04 NOTE — PROGRESS NOTE ADULT - SUBJECTIVE AND OBJECTIVE BOX
INTERVAL HPI/OVERNIGHT EVENTS:  denies abd pain, n/v/diarrhea  tolerating diet    HPI:  46 Y.O. M presents to [place] ED with 1 month history of worsening abdominal pain. Pt reports pain started spontaneously and is globally about the abdomen and somewhat worse in the right lower quadrant region. Pt reports mild nausea and intermittent greenish diarrhea x past 1 month. Pt denies any history of abdominal surgery in the past but does report episode of colitis ~20 years ago treated with IV antibiotics only and no surgical intervention. Pt reports having a colonoscopy at the time but does not recall having any recent colonoscopy or upper endoscopy. Pt reports having lower grade fevers  and chills for the past few days. Pt reports 1 pisode of blood in his stool yesterday with no recurrence. Pt reports taking xanax and tylenol as needed for pain. Pt reports the symptoms have remained the same but patient decided to see PMD- Dr. Gates and Dr. Alcala today and was sent to ED for further management. Pt underwent CT Abd/Pelvis upon arrival to ED which revealed- Large thick walled collection in the lower abdomen in the midline suggestive of abscess with air-fluid level. A loop of small bowel is draped around this collection however the origin may be from adjacent diverticulitis in the proximal sigmoid colon. Nonobstructive right renal calculus. Thickened and nodular adrenal glands suggests hyperplasia. Mild thickening of the distal esophagus.    Denies any recent CP, SOB, dysuria. (29 Nov 2017 16:17)    MEDICATIONS  (STANDING):  pantoprazole  Injectable 40 milliGRAM(s) IV Push daily  piperacillin/tazobactam IVPB. 3.375 Gram(s) IV Intermittent every 8 hours    MEDICATIONS  (PRN):  acetaminophen   Tablet 650 milliGRAM(s) Oral every 6 hours PRN For Temp greater than 38 C (100.4 F)  morphine  - Injectable 2 milliGRAM(s) IV Push every 4 hours PRN Moderate Pain (4 - 6)  morphine  - Injectable 4 milliGRAM(s) IV Push every 6 hours PRN Severe Pain (7 - 10)  nicotine - 21 mG/24Hr(s) Patch 1 patch Transdermal daily PRN nicotine w/d sx  ondansetron Injectable 4 milliGRAM(s) IV Push every 8 hours PRN Nausea and/or Vomiting      Allergies    No Known Allergies    Intolerances          General:  No wt loss, fevers, chills, night sweats, fatigue,   Eyes:  Good vision, no reported pain  ENT:  No sore throat, pain, runny nose, dysphagia  CV:  No pain, palpitations, hypo/hypertension  Resp:  No dyspnea, cough, tachypnea, wheezing  GI:  No pain, No nausea, No vomiting, No diarrhea, No constipation, No weight loss, No fever, No pruritis, No rectal bleeding, No tarry stools, No dysphagia,  :  No pain, bleeding, incontinence, nocturia  Muscle:  No pain, weakness  Neuro:  No weakness, tingling, memory problems  Psych:  No fatigue, insomnia, mood problems, depression  Endocrine:  No polyuria, polydipsia, cold/heat intolerance  Heme:  No petechiae, ecchymosis, easy bruisability  Skin:  No rash, tattoos, scars, edema      PHYSICAL EXAM:   Vital Signs:  Vital Signs Last 24 Hrs  T(C): 36.9 (04 Dec 2017 14:02), Max: 37 (04 Dec 2017 11:53)  T(F): 98.5 (04 Dec 2017 14:02), Max: 98.6 (04 Dec 2017 11:53)  HR: 85 (04 Dec 2017 14:02) (74 - 85)  BP: 127/78 (04 Dec 2017 14:02) (117/72 - 135/86)  BP(mean): --  RR: 17 (04 Dec 2017 14:02) (17 - 18)  SpO2: 97% (04 Dec 2017 14:02) (97% - 99%)  Daily     Daily I&O's Summary    03 Dec 2017 07:01  -  04 Dec 2017 07:00  --------------------------------------------------------  IN: 300 mL / OUT: 0 mL / NET: 300 mL    04 Dec 2017 07:01  -  04 Dec 2017 15:59  --------------------------------------------------------  IN: 25 mL / OUT: 0 mL / NET: 25 mL        GENERAL:  Appears stated age, well-groomed, well-nourished, no distress  HEENT:  NC/AT,  conjunctivae clear and pink, no thyromegaly, nodules, adenopathy, no JVD, sclera -anicteric  CHEST:  Full & symmetric excursion, no increased effort, breath sounds clear  HEART:  Regular rhythm, S1, S2, no murmur/rub/S3/S4, no abdominal bruit, no edema  ABDOMEN:  Soft, non-tender, non-distended, normoactive bowel sounds,  no masses ,no hepato-splenomegaly, no signs of chronic liver disease  EXTEREMITIES:  no cyanosis,clubbing or edema  SKIN:  No rash/erythema/ecchymoses/petechiae/wounds/abscess/warm/dry  NEURO:  Alert, oriented, no asterixis, no tremor, no encephalopathy      LABS:              amylase   lipase  RADIOLOGY & ADDITIONAL TESTS:

## 2017-12-04 NOTE — PROGRESS NOTE ADULT - PROVIDER SPECIALTY LIST ADULT
Cardiology
Critical Care
Gastroenterology
Infectious Disease
Infectious Disease
Internal Medicine
Pulmonology
Surgery
Pulmonology
Surgery
Cardiology
Cardiology
Gastroenterology
Pulmonology
Pulmonology
Internal Medicine

## 2017-12-04 NOTE — PROGRESS NOTE ADULT - PROBLEM SELECTOR PLAN 1
? secondary to diverticulitis/malignancy/possible Crohn's disease  continue IV zosyn, follow up cultures, ID recs  IVF hydration   check ANCA/ASCA   will need eventual endoscopic intervention in 6-8 weeks  consider CT enterography at time of repeat imaging to evaluate small bowel
EDWAR  resolved  cr normal  on IVF  monitor labs, replete lytes  am labs pending
? secondary to diverticulitis/malignancy/possible Crohn's disease  continue IV zosyn, follow up cultures, ID recs  IVF hydration   check ANCA/ASCA   will need eventual endoscopic intervention in 6-8 weeks  consider CT enterography at time of repeat imaging to evaluate small bowel
? secondary to diverticulitis/malignancy/possible Crohn's disease  continue IV zosyn, follow up cultures, ID recs  IVF hydration   check ANCA/ASCA   will need eventual endoscopic intervention in 6-8 weeks  consider CT enterography at time of repeat imaging to evaluate small bowel
? secondary to diverticulitis/malignancy/possible Crohn's disease  continue IV zosyn, follow up cultures, ID recs  no communication of abscess with bowel during tube check, d/c'd  IVF hydration   ANCA/ASCA negative  will need eventual endoscopic intervention in 6-8 weeks
smoking cess ed  NRT  counseling  substance use disorder  discussed with patient, U tox discussed
Patient s/p drain placement via IR  Pan-cultured  F/U cultures  Continue iv abx  ID/Surgery f/u  Check drain on Monday
Patient s/p drain placement via IR  Pan-cultured  F/U cultures  Continue iv abx  ID/Surgery f/u.
Patient s/p drain placement via IR  Pan-cultured  change to po abx x 7 days  ID/Surgery f/u  o/p colonoscopy in 6-8 weeks
Serial imaging  Cont Abx  F/U Cx  Await ASCA  Endoscopy per GI
smoking cess ed  NRT  counseling
smoking cess ed  counseling  NRT
Patient s/p drain placement via IR  Pan-cultured  F/U cultures  Continue iv abx  ID/Surgery f/u  Check drain on Monday

## 2017-12-10 LAB
AMPHET UR-MCNC: NEGATIVE — SIGNIFICANT CHANGE UP
BARBITURATES, URINE.: NEGATIVE — SIGNIFICANT CHANGE UP
BENZODIAZ UR-MCNC: NEGATIVE — SIGNIFICANT CHANGE UP
COCAINE METAB.OTHER UR-MCNC: SIGNIFICANT CHANGE UP
CREATININE, URINE THERAPEUTIC: 54.3 MG/DL — SIGNIFICANT CHANGE UP
METHADONE UR-MCNC: NEGATIVE — SIGNIFICANT CHANGE UP
METHAQUALONE UR QL: NEGATIVE — SIGNIFICANT CHANGE UP
METHAQUALONE UR-MCNC: NEGATIVE — SIGNIFICANT CHANGE UP
OPIATES UR-MCNC: SIGNIFICANT CHANGE UP
PCP UR-MCNC: NEGATIVE — SIGNIFICANT CHANGE UP
PROPOXYPH UR QL: NEGATIVE — SIGNIFICANT CHANGE UP
THC UR QL: NEGATIVE — SIGNIFICANT CHANGE UP

## 2019-02-27 ENCOUNTER — OUTPATIENT (OUTPATIENT)
Dept: OUTPATIENT SERVICES | Facility: HOSPITAL | Age: 49
LOS: 1 days | End: 2019-02-27
Payer: COMMERCIAL

## 2019-02-27 VITALS
DIASTOLIC BLOOD PRESSURE: 74 MMHG | SYSTOLIC BLOOD PRESSURE: 122 MMHG | HEART RATE: 91 BPM | RESPIRATION RATE: 16 BRPM | WEIGHT: 207.9 LBS | OXYGEN SATURATION: 98 % | TEMPERATURE: 98 F

## 2019-02-27 DIAGNOSIS — M16.11 UNILATERAL PRIMARY OSTEOARTHRITIS, RIGHT HIP: ICD-10-CM

## 2019-02-27 DIAGNOSIS — Z01.818 ENCOUNTER FOR OTHER PREPROCEDURAL EXAMINATION: ICD-10-CM

## 2019-02-27 DIAGNOSIS — S41.119A LACERATION WITHOUT FOREIGN BODY OF UNSPECIFIED UPPER ARM, INITIAL ENCOUNTER: Chronic | ICD-10-CM

## 2019-02-27 DIAGNOSIS — Z98.890 OTHER SPECIFIED POSTPROCEDURAL STATES: Chronic | ICD-10-CM

## 2019-02-27 LAB
ALBUMIN SERPL ELPH-MCNC: 3.6 G/DL — SIGNIFICANT CHANGE UP (ref 3.3–5)
ALP SERPL-CCNC: 84 U/L — SIGNIFICANT CHANGE UP (ref 40–120)
ALT FLD-CCNC: 20 U/L — SIGNIFICANT CHANGE UP (ref 12–78)
ANION GAP SERPL CALC-SCNC: 8 MMOL/L — SIGNIFICANT CHANGE UP (ref 5–17)
APPEARANCE UR: CLEAR — SIGNIFICANT CHANGE UP
APTT BLD: 36.1 SEC — SIGNIFICANT CHANGE UP (ref 28.5–37)
AST SERPL-CCNC: 14 U/L — LOW (ref 15–37)
BILIRUB SERPL-MCNC: 0.4 MG/DL — SIGNIFICANT CHANGE UP (ref 0.2–1.2)
BILIRUB UR-MCNC: NEGATIVE — SIGNIFICANT CHANGE UP
BUN SERPL-MCNC: 15 MG/DL — SIGNIFICANT CHANGE UP (ref 7–23)
CALCIUM SERPL-MCNC: 9.2 MG/DL — SIGNIFICANT CHANGE UP (ref 8.5–10.1)
CHLORIDE SERPL-SCNC: 106 MMOL/L — SIGNIFICANT CHANGE UP (ref 96–108)
CO2 SERPL-SCNC: 28 MMOL/L — SIGNIFICANT CHANGE UP (ref 22–31)
COLOR SPEC: YELLOW — SIGNIFICANT CHANGE UP
CREAT SERPL-MCNC: 1.1 MG/DL — SIGNIFICANT CHANGE UP (ref 0.5–1.3)
DIFF PNL FLD: ABNORMAL
GLUCOSE SERPL-MCNC: 123 MG/DL — HIGH (ref 70–99)
GLUCOSE UR QL: NEGATIVE — SIGNIFICANT CHANGE UP
HCT VFR BLD CALC: 46.3 % — SIGNIFICANT CHANGE UP (ref 39–50)
HGB BLD-MCNC: 15.8 G/DL — SIGNIFICANT CHANGE UP (ref 13–17)
INR BLD: 1.06 RATIO — SIGNIFICANT CHANGE UP (ref 0.88–1.16)
KETONES UR-MCNC: ABNORMAL
LEUKOCYTE ESTERASE UR-ACNC: ABNORMAL
MCHC RBC-ENTMCNC: 29.9 PG — SIGNIFICANT CHANGE UP (ref 27–34)
MCHC RBC-ENTMCNC: 34.1 GM/DL — SIGNIFICANT CHANGE UP (ref 32–36)
MCV RBC AUTO: 87.5 FL — SIGNIFICANT CHANGE UP (ref 80–100)
NITRITE UR-MCNC: NEGATIVE — SIGNIFICANT CHANGE UP
NRBC # BLD: 0 /100 WBCS — SIGNIFICANT CHANGE UP (ref 0–0)
PH UR: 5 — SIGNIFICANT CHANGE UP (ref 5–8)
PLATELET # BLD AUTO: 265 K/UL — SIGNIFICANT CHANGE UP (ref 150–400)
POTASSIUM SERPL-MCNC: 4.1 MMOL/L — SIGNIFICANT CHANGE UP (ref 3.5–5.3)
POTASSIUM SERPL-SCNC: 4.1 MMOL/L — SIGNIFICANT CHANGE UP (ref 3.5–5.3)
PROT SERPL-MCNC: 7.9 G/DL — SIGNIFICANT CHANGE UP (ref 6–8.3)
PROT UR-MCNC: 25 MG/DL
PROTHROM AB SERPL-ACNC: 12.1 SEC — SIGNIFICANT CHANGE UP (ref 10–12.9)
RBC # BLD: 5.29 M/UL — SIGNIFICANT CHANGE UP (ref 4.2–5.8)
RBC # FLD: 12.7 % — SIGNIFICANT CHANGE UP (ref 10.3–14.5)
SODIUM SERPL-SCNC: 142 MMOL/L — SIGNIFICANT CHANGE UP (ref 135–145)
SP GR SPEC: 1.02 — SIGNIFICANT CHANGE UP (ref 1.01–1.02)
UROBILINOGEN FLD QL: NEGATIVE — SIGNIFICANT CHANGE UP
WBC # BLD: 14.75 K/UL — HIGH (ref 3.8–10.5)
WBC # FLD AUTO: 14.75 K/UL — HIGH (ref 3.8–10.5)

## 2019-02-27 PROCEDURE — 85610 PROTHROMBIN TIME: CPT

## 2019-02-27 PROCEDURE — 73502 X-RAY EXAM HIP UNI 2-3 VIEWS: CPT

## 2019-02-27 PROCEDURE — 81001 URINALYSIS AUTO W/SCOPE: CPT

## 2019-02-27 PROCEDURE — 85730 THROMBOPLASTIN TIME PARTIAL: CPT

## 2019-02-27 PROCEDURE — 87640 STAPH A DNA AMP PROBE: CPT

## 2019-02-27 PROCEDURE — 87641 MR-STAPH DNA AMP PROBE: CPT

## 2019-02-27 PROCEDURE — 83036 HEMOGLOBIN GLYCOSYLATED A1C: CPT

## 2019-02-27 PROCEDURE — 73502 X-RAY EXAM HIP UNI 2-3 VIEWS: CPT | Mod: 26,RT

## 2019-02-27 PROCEDURE — 36415 COLL VENOUS BLD VENIPUNCTURE: CPT

## 2019-02-27 PROCEDURE — 93005 ELECTROCARDIOGRAM TRACING: CPT

## 2019-02-27 PROCEDURE — 93010 ELECTROCARDIOGRAM REPORT: CPT | Mod: NC

## 2019-02-27 PROCEDURE — G0463: CPT

## 2019-02-27 PROCEDURE — 85027 COMPLETE CBC AUTOMATED: CPT

## 2019-02-27 PROCEDURE — 71046 X-RAY EXAM CHEST 2 VIEWS: CPT | Mod: 26

## 2019-02-27 PROCEDURE — 86850 RBC ANTIBODY SCREEN: CPT

## 2019-02-27 PROCEDURE — 87086 URINE CULTURE/COLONY COUNT: CPT

## 2019-02-27 PROCEDURE — 71046 X-RAY EXAM CHEST 2 VIEWS: CPT

## 2019-02-27 PROCEDURE — 80053 COMPREHEN METABOLIC PANEL: CPT

## 2019-02-27 PROCEDURE — 86900 BLOOD TYPING SEROLOGIC ABO: CPT

## 2019-02-27 PROCEDURE — 86901 BLOOD TYPING SEROLOGIC RH(D): CPT

## 2019-02-27 RX ORDER — ACETAMINOPHEN 500 MG
2 TABLET ORAL
Qty: 0 | Refills: 0 | COMMUNITY

## 2019-02-27 RX ORDER — ALPRAZOLAM 0.25 MG
1 TABLET ORAL
Qty: 0 | Refills: 0 | COMMUNITY

## 2019-02-27 NOTE — H&P PST ADULT - RS GEN PE MLT RESP DETAILS PC
airway patent/breath sounds equal/respirations non-labored/clear to auscultation bilaterally/normal/good air movement

## 2019-02-27 NOTE — H&P PST ADULT - PMH
No pertinent past medical history <<----- Click to add NO pertinent Past Medical History Unilateral primary osteoarthritis, right hip

## 2019-02-27 NOTE — H&P PST ADULT - HISTORY OF PRESENT ILLNESS
47yo male with no PMH here for PST. Pt states he started to have right hip pain that started about 2 years ago radiating to right groin getting progressively worse. Pt rates pain to be 10/10 and pt was not taking any po analgesia. Pt states surgeon just prescribed Tramadol PRN and pt will pick it up today. Pt with limping gait and unable to ambulate for long periods of time due to pain.  Pt electing for right anterior total hip replacement on 3/12/19.

## 2019-02-27 NOTE — H&P PST ADULT - PSH
Arm laceration  repair of arm laceration 1995- tendon repair  S/P shoulder surgery  left Arm laceration  (Repair of arm laceration 1995- tendon repair)  S/P shoulder surgery  (Left, 2002) Arm laceration  (Repair of right forearm laceration 1995- tendon repair)  S/P shoulder surgery  (Left, 2002) Arm laceration  (Repair of right forearm laceration 1995- tendon repair)  History of colonoscopy    S/P shoulder surgery  (Left, 2002)

## 2019-02-27 NOTE — H&P PST ADULT - NSANTHOSAYNRD_GEN_A_CORE
No. GARLAND screening performed.  STOP BANG Legend: 0-2 = LOW Risk; 3-4 = INTERMEDIATE Risk; 5-8 = HIGH Risk

## 2019-02-27 NOTE — H&P PST ADULT - NEGATIVE CARDIOVASCULAR SYMPTOMS
no orthopnea/no paroxysmal nocturnal dyspnea/no peripheral edema/no palpitations/no chest pain/no claudication/no dyspnea on exertion

## 2019-02-28 LAB
CULTURE RESULTS: SIGNIFICANT CHANGE UP
HBA1C BLD-MCNC: 5.5 % — SIGNIFICANT CHANGE UP (ref 4–5.6)
MRSA PCR RESULT.: SIGNIFICANT CHANGE UP
S AUREUS DNA NOSE QL NAA+PROBE: DETECTED
SPECIMEN SOURCE: SIGNIFICANT CHANGE UP

## 2019-02-28 RX ORDER — MUPIROCIN 20 MG/G
1 OINTMENT TOPICAL
Qty: 1 | Refills: 0 | OUTPATIENT
Start: 2019-02-28 | End: 2019-03-04

## 2019-03-11 ENCOUNTER — TRANSCRIPTION ENCOUNTER (OUTPATIENT)
Age: 49
End: 2019-03-11

## 2019-03-11 RX ORDER — CEFAZOLIN SODIUM 1 G
2000 VIAL (EA) INJECTION ONCE
Qty: 0 | Refills: 0 | Status: COMPLETED | OUTPATIENT
Start: 2019-03-12 | End: 2019-03-12

## 2019-03-11 RX ORDER — BUPIVACAINE 13.3 MG/ML
20 INJECTION, SUSPENSION, LIPOSOMAL INFILTRATION ONCE
Qty: 0 | Refills: 0 | Status: COMPLETED | OUTPATIENT
Start: 2019-03-12 | End: 2019-03-12

## 2019-03-12 ENCOUNTER — RESULT REVIEW (OUTPATIENT)
Age: 49
End: 2019-03-12

## 2019-03-12 ENCOUNTER — INPATIENT (INPATIENT)
Facility: HOSPITAL | Age: 49
LOS: 0 days | Discharge: HOME | DRG: 470 | End: 2019-03-13
Attending: ORTHOPAEDIC SURGERY | Admitting: ORTHOPAEDIC SURGERY
Payer: COMMERCIAL

## 2019-03-12 VITALS
WEIGHT: 207.9 LBS | HEIGHT: 73 IN | RESPIRATION RATE: 15 BRPM | HEART RATE: 79 BPM | TEMPERATURE: 98 F | SYSTOLIC BLOOD PRESSURE: 132 MMHG | DIASTOLIC BLOOD PRESSURE: 88 MMHG | OXYGEN SATURATION: 95 %

## 2019-03-12 DIAGNOSIS — M16.11 UNILATERAL PRIMARY OSTEOARTHRITIS, RIGHT HIP: ICD-10-CM

## 2019-03-12 DIAGNOSIS — S41.119A LACERATION WITHOUT FOREIGN BODY OF UNSPECIFIED UPPER ARM, INITIAL ENCOUNTER: Chronic | ICD-10-CM

## 2019-03-12 DIAGNOSIS — Z98.890 OTHER SPECIFIED POSTPROCEDURAL STATES: Chronic | ICD-10-CM

## 2019-03-12 LAB
ABO RH CONFIRMATION: SIGNIFICANT CHANGE UP
ANION GAP SERPL CALC-SCNC: 6 MMOL/L — SIGNIFICANT CHANGE UP (ref 5–17)
BUN SERPL-MCNC: 16 MG/DL — SIGNIFICANT CHANGE UP (ref 7–23)
CALCIUM SERPL-MCNC: 7.9 MG/DL — LOW (ref 8.5–10.1)
CHLORIDE SERPL-SCNC: 110 MMOL/L — HIGH (ref 96–108)
CO2 SERPL-SCNC: 26 MMOL/L — SIGNIFICANT CHANGE UP (ref 22–31)
CREAT SERPL-MCNC: 0.99 MG/DL — SIGNIFICANT CHANGE UP (ref 0.5–1.3)
GLUCOSE SERPL-MCNC: 146 MG/DL — HIGH (ref 70–99)
HCT VFR BLD CALC: 36.2 % — LOW (ref 39–50)
HGB BLD-MCNC: 12.1 G/DL — LOW (ref 13–17)
MCHC RBC-ENTMCNC: 29.3 PG — SIGNIFICANT CHANGE UP (ref 27–34)
MCHC RBC-ENTMCNC: 33.4 GM/DL — SIGNIFICANT CHANGE UP (ref 32–36)
MCV RBC AUTO: 87.7 FL — SIGNIFICANT CHANGE UP (ref 80–100)
NRBC # BLD: 0 /100 WBCS — SIGNIFICANT CHANGE UP (ref 0–0)
PLATELET # BLD AUTO: 204 K/UL — SIGNIFICANT CHANGE UP (ref 150–400)
POTASSIUM SERPL-MCNC: 4.1 MMOL/L — SIGNIFICANT CHANGE UP (ref 3.5–5.3)
POTASSIUM SERPL-SCNC: 4.1 MMOL/L — SIGNIFICANT CHANGE UP (ref 3.5–5.3)
RBC # BLD: 4.13 M/UL — LOW (ref 4.2–5.8)
RBC # FLD: 12.5 % — SIGNIFICANT CHANGE UP (ref 10.3–14.5)
SODIUM SERPL-SCNC: 142 MMOL/L — SIGNIFICANT CHANGE UP (ref 135–145)
WBC # BLD: 16.15 K/UL — HIGH (ref 3.8–10.5)
WBC # FLD AUTO: 16.15 K/UL — HIGH (ref 3.8–10.5)

## 2019-03-12 PROCEDURE — 99221 1ST HOSP IP/OBS SF/LOW 40: CPT

## 2019-03-12 PROCEDURE — 88311 DECALCIFY TISSUE: CPT | Mod: 26

## 2019-03-12 PROCEDURE — 88305 TISSUE EXAM BY PATHOLOGIST: CPT | Mod: 26

## 2019-03-12 RX ORDER — FOLIC ACID 0.8 MG
1 TABLET ORAL DAILY
Qty: 0 | Refills: 0 | Status: DISCONTINUED | OUTPATIENT
Start: 2019-03-12 | End: 2019-03-13

## 2019-03-12 RX ORDER — ENOXAPARIN SODIUM 100 MG/ML
40 INJECTION SUBCUTANEOUS EVERY 24 HOURS
Qty: 0 | Refills: 0 | Status: DISCONTINUED | OUTPATIENT
Start: 2019-03-13 | End: 2019-03-13

## 2019-03-12 RX ORDER — FERROUS SULFATE 325(65) MG
325 TABLET ORAL
Qty: 0 | Refills: 0 | Status: DISCONTINUED | OUTPATIENT
Start: 2019-03-12 | End: 2019-03-13

## 2019-03-12 RX ORDER — ONDANSETRON 8 MG/1
4 TABLET, FILM COATED ORAL EVERY 6 HOURS
Qty: 0 | Refills: 0 | Status: DISCONTINUED | OUTPATIENT
Start: 2019-03-12 | End: 2019-03-13

## 2019-03-12 RX ORDER — OXYCODONE HYDROCHLORIDE 5 MG/1
10 TABLET ORAL EVERY 4 HOURS
Qty: 0 | Refills: 0 | Status: DISCONTINUED | OUTPATIENT
Start: 2019-03-12 | End: 2019-03-13

## 2019-03-12 RX ORDER — ACETAMINOPHEN 500 MG
1000 TABLET ORAL ONCE
Qty: 0 | Refills: 0 | Status: COMPLETED | OUTPATIENT
Start: 2019-03-12 | End: 2019-03-12

## 2019-03-12 RX ORDER — METOCLOPRAMIDE HCL 10 MG
5 TABLET ORAL ONCE
Qty: 0 | Refills: 0 | Status: DISCONTINUED | OUTPATIENT
Start: 2019-03-12 | End: 2019-03-12

## 2019-03-12 RX ORDER — POLYETHYLENE GLYCOL 3350 17 G/17G
17 POWDER, FOR SOLUTION ORAL DAILY
Qty: 0 | Refills: 0 | Status: DISCONTINUED | OUTPATIENT
Start: 2019-03-12 | End: 2019-03-13

## 2019-03-12 RX ORDER — OXYCODONE HYDROCHLORIDE 5 MG/1
5 TABLET ORAL ONCE
Qty: 0 | Refills: 0 | Status: DISCONTINUED | OUTPATIENT
Start: 2019-03-12 | End: 2019-03-12

## 2019-03-12 RX ORDER — ASCORBIC ACID 60 MG
500 TABLET,CHEWABLE ORAL
Qty: 0 | Refills: 0 | Status: DISCONTINUED | OUTPATIENT
Start: 2019-03-12 | End: 2019-03-13

## 2019-03-12 RX ORDER — DOCUSATE SODIUM 100 MG
100 CAPSULE ORAL THREE TIMES A DAY
Qty: 0 | Refills: 0 | Status: DISCONTINUED | OUTPATIENT
Start: 2019-03-12 | End: 2019-03-13

## 2019-03-12 RX ORDER — SENNA PLUS 8.6 MG/1
2 TABLET ORAL AT BEDTIME
Qty: 0 | Refills: 0 | Status: DISCONTINUED | OUTPATIENT
Start: 2019-03-12 | End: 2019-03-13

## 2019-03-12 RX ORDER — SODIUM CHLORIDE 9 MG/ML
1000 INJECTION, SOLUTION INTRAVENOUS
Qty: 0 | Refills: 0 | Status: DISCONTINUED | OUTPATIENT
Start: 2019-03-12 | End: 2019-03-13

## 2019-03-12 RX ORDER — HYDROMORPHONE HYDROCHLORIDE 2 MG/ML
0.5 INJECTION INTRAMUSCULAR; INTRAVENOUS; SUBCUTANEOUS
Qty: 0 | Refills: 0 | Status: DISCONTINUED | OUTPATIENT
Start: 2019-03-12 | End: 2019-03-13

## 2019-03-12 RX ORDER — ACETAMINOPHEN 500 MG
650 TABLET ORAL EVERY 6 HOURS
Qty: 0 | Refills: 0 | Status: DISCONTINUED | OUTPATIENT
Start: 2019-03-12 | End: 2019-03-13

## 2019-03-12 RX ORDER — ZOLPIDEM TARTRATE 10 MG/1
5 TABLET ORAL AT BEDTIME
Qty: 0 | Refills: 0 | Status: DISCONTINUED | OUTPATIENT
Start: 2019-03-12 | End: 2019-03-13

## 2019-03-12 RX ORDER — TRAMADOL HYDROCHLORIDE 50 MG/1
50 TABLET ORAL EVERY 6 HOURS
Qty: 0 | Refills: 0 | Status: DISCONTINUED | OUTPATIENT
Start: 2019-03-12 | End: 2019-03-13

## 2019-03-12 RX ORDER — HYDROMORPHONE HYDROCHLORIDE 2 MG/ML
0.5 INJECTION INTRAMUSCULAR; INTRAVENOUS; SUBCUTANEOUS
Qty: 0 | Refills: 0 | Status: DISCONTINUED | OUTPATIENT
Start: 2019-03-12 | End: 2019-03-12

## 2019-03-12 RX ORDER — KETOROLAC TROMETHAMINE 30 MG/ML
30 SYRINGE (ML) INJECTION ONCE
Qty: 0 | Refills: 0 | Status: DISCONTINUED | OUTPATIENT
Start: 2019-03-12 | End: 2019-03-12

## 2019-03-12 RX ORDER — PANTOPRAZOLE SODIUM 20 MG/1
40 TABLET, DELAYED RELEASE ORAL
Qty: 0 | Refills: 0 | Status: DISCONTINUED | OUTPATIENT
Start: 2019-03-12 | End: 2019-03-13

## 2019-03-12 RX ORDER — SODIUM CHLORIDE 9 MG/ML
1000 INJECTION, SOLUTION INTRAVENOUS
Qty: 0 | Refills: 0 | Status: DISCONTINUED | OUTPATIENT
Start: 2019-03-12 | End: 2019-03-12

## 2019-03-12 RX ORDER — CEFAZOLIN SODIUM 1 G
2000 VIAL (EA) INJECTION EVERY 8 HOURS
Qty: 0 | Refills: 0 | Status: COMPLETED | OUTPATIENT
Start: 2019-03-12 | End: 2019-03-13

## 2019-03-12 RX ORDER — OXYCODONE HYDROCHLORIDE 5 MG/1
5 TABLET ORAL EVERY 4 HOURS
Qty: 0 | Refills: 0 | Status: DISCONTINUED | OUTPATIENT
Start: 2019-03-12 | End: 2019-03-13

## 2019-03-12 RX ADMIN — OXYCODONE HYDROCHLORIDE 10 MILLIGRAM(S): 5 TABLET ORAL at 17:36

## 2019-03-12 RX ADMIN — Medication 325 MILLIGRAM(S): at 17:34

## 2019-03-12 RX ADMIN — HYDROMORPHONE HYDROCHLORIDE 0.5 MILLIGRAM(S): 2 INJECTION INTRAMUSCULAR; INTRAVENOUS; SUBCUTANEOUS at 11:10

## 2019-03-12 RX ADMIN — SODIUM CHLORIDE 100 MILLILITER(S): 9 INJECTION, SOLUTION INTRAVENOUS at 11:50

## 2019-03-12 RX ADMIN — HYDROMORPHONE HYDROCHLORIDE 0.5 MILLIGRAM(S): 2 INJECTION INTRAMUSCULAR; INTRAVENOUS; SUBCUTANEOUS at 11:26

## 2019-03-12 RX ADMIN — OXYCODONE HYDROCHLORIDE 10 MILLIGRAM(S): 5 TABLET ORAL at 16:30

## 2019-03-12 RX ADMIN — Medication 30 MILLIGRAM(S): at 19:26

## 2019-03-12 RX ADMIN — OXYCODONE HYDROCHLORIDE 10 MILLIGRAM(S): 5 TABLET ORAL at 21:00

## 2019-03-12 RX ADMIN — HYDROMORPHONE HYDROCHLORIDE 0.5 MILLIGRAM(S): 2 INJECTION INTRAMUSCULAR; INTRAVENOUS; SUBCUTANEOUS at 23:00

## 2019-03-12 RX ADMIN — HYDROMORPHONE HYDROCHLORIDE 0.5 MILLIGRAM(S): 2 INJECTION INTRAMUSCULAR; INTRAVENOUS; SUBCUTANEOUS at 12:14

## 2019-03-12 RX ADMIN — Medication 400 MILLIGRAM(S): at 21:30

## 2019-03-12 RX ADMIN — HYDROMORPHONE HYDROCHLORIDE 0.5 MILLIGRAM(S): 2 INJECTION INTRAMUSCULAR; INTRAVENOUS; SUBCUTANEOUS at 11:52

## 2019-03-12 RX ADMIN — HYDROMORPHONE HYDROCHLORIDE 0.5 MILLIGRAM(S): 2 INJECTION INTRAMUSCULAR; INTRAVENOUS; SUBCUTANEOUS at 11:27

## 2019-03-12 RX ADMIN — OXYCODONE HYDROCHLORIDE 10 MILLIGRAM(S): 5 TABLET ORAL at 20:30

## 2019-03-12 RX ADMIN — HYDROMORPHONE HYDROCHLORIDE 0.5 MILLIGRAM(S): 2 INJECTION INTRAMUSCULAR; INTRAVENOUS; SUBCUTANEOUS at 22:34

## 2019-03-12 RX ADMIN — Medication 100 MILLIGRAM(S): at 17:34

## 2019-03-12 RX ADMIN — Medication 500 MILLIGRAM(S): at 17:34

## 2019-03-12 NOTE — PROGRESS NOTE ADULT - SUBJECTIVE AND OBJECTIVE BOX
Orthopaedic Surgery Post-Operative Progress Note    Pt reports pain is well controlled. Tolerated procedure well. Denies fevers, dizziness, CP, SOB, N/V, numbness/tingling, calf pain.    Labs:                        12.1   16.15 )-----------( 204      ( 12 Mar 2019 11:38 )             36.2     03-12    142  |  110<H>  |  16  ----------------------------<  146<H>  4.1   |  26  |  0.99    Ca    7.9<L>      12 Mar 2019 11:37      Vitals:  T(C): 37.2 (03-12-19 @ 20:33), Max: 37.2 (03-12-19 @ 20:33)  HR: 88 (03-12-19 @ 20:33) (67 - 88)  BP: 124/68 (03-12-19 @ 20:33) (120/80 - 138/89)  RR: 18 (03-12-19 @ 20:33) (15 - 18)  SpO2: 98% (03-12-19 @ 20:33) (94% - 98%)    Physical Exam:  General: NAD, Resting Comfortably    RLE:  Dressing Clean, Dry, Intact  SILT L2-S1  Gsc/TA/EHL/FHL Intact  DP/PT 2+  Compartments Soft and Compressible  No calf tenderness

## 2019-03-12 NOTE — PROGRESS NOTE ADULT - ASSESSMENT
Pt is a 48y Male POD 0 from R Ant FRANSISCO.  -Stable  -Tolerated procedure well  -Pain Control  -DVT PPx Starting POD 1  -Continue Post-Op Abx   -Encourage Incentive Spirometry  -WBAT RLE/LLE  -PT/OT  -Med Management  -Dispo Planning  -Will discuss with attending and advise if plan changes.    Sara Gomez M.D.  PGY-1 Orthopaedic Surgery

## 2019-03-12 NOTE — CONSULT NOTE ADULT - ASSESSMENT
a/p: 49 yo m pmh type 2 DM - diet controlled, osteoarthritis of the hip POD # 0 from right total hip arthroscopy. pt does not want to be examined at this time. Currently hemodynamically stable with no complaints. Will reassess in am. No active concerns.

## 2019-03-12 NOTE — PATIENT PROFILE ADULT - FUNCTIONAL SCREEN CURRENT LEVEL: EATING, MLM
[facility]  Cardiology Progress Note    Patient Identification:  Name:Hugh R McBurney  Age:66 y.o.  Sex: male  :  1950  MRN: [id number]             Length of Stay: 6    Follow up for: ANASTASIA    Interval History :      Had some nausea and vomiting today.  He denies chest pain or chest discomfort. Denies shortness of breath, dizziness, palpitations.  No reported LOC.  Telemetry monitor reports sinus, 6 beat PVC yesterday afternoon and completely asymptomatic. Denies much pain.  Takes pain meds    BP still running high even on added procardia, chlorthalidone, lisinopril. BS still high  ANASTASIA no evidence of endocarditis and normal EF    Vascular asking:if ok transfer to skilled unit if agreeable with all.     Hyperglycemia   This is a chronic problem.   Shortness of Breath     Flank Pain       This is a 66-year-old white male, with no prior cardiac history, with history of diabetes, hypertension, hyperlipidemia, chronic pain knees and back, presenting for hyperglycemia, worsening right knee redness and pain. Cardiology consultation asked to evaluate undergo ANASTASIA or transesophageal echocardiogram per Dr. Mayberry.    Past Medical History   Diagnosis Date   • Chronic pain      BILATERAL KNEES AND BACK   • Depression    • Diabetes mellitus    • Hyperlipidemia    • Hypertension    • Kidney stone        Scheduled Meds:    Current Facility-Administered Medications:   •  acetaminophen (TYLENOL) tablet 650 mg, 650 mg, Oral, Q4H PRN, Elmira Rivera MD, 650 mg at 17 0638  •  bisacodyl (DULCOLAX) suppository 10 mg, 10 mg, Rectal, Daily PRN, Oscar Lucas MD, 10 mg at 17 0328  •  ceFAZolin in 0.9% normal saline (ANCEF) IVPB solution 2 g, 2 g, Intravenous, Q8H, Adarsh Love MD, Last Rate: 200 mL/hr at 17 1337, 2 g at 17 1337  •  chlorthalidone (HYGROTON) tablet 25 mg, 25 mg, Oral, Daily, ABBY Staples, 25 mg at 17 1000  •  dextrose (D50W)  solution 25 g, 25 g, Intravenous, Q15 Min PRN, Oscar Lucas MD  •  dextrose (GLUTOSE) oral gel 15 g, 15 g, Oral, Q15 Min PRN, Oscar Lucas MD  •  enoxaparin (LOVENOX) syringe 40 mg, 40 mg, Subcutaneous, Daily, Oscar Lucas MD, 40 mg at 03/03/17 1009  •  glucagon (human recombinant) (GLUCAGEN DIAGNOSTIC) injection 1 mg, 1 mg, Subcutaneous, Q15 Min PRN, Oscar Lucas MD  •  HYDROcodone-acetaminophen (NORCO)  MG per tablet 1 tablet, 1 tablet, Oral, Q6H PRN, Oscar Lucas MD, 1 tablet at 03/03/17 0655  •  insulin aspart (novoLOG) injection 0-15 Units, 0-15 Units, Subcutaneous, 4x Daily AC & at Bedtime, Adarsh Love MD, 15 Units at 03/03/17 1229  •  insulin aspart (novoLOG) injection 20 Units, 20 Units, Subcutaneous, TID With Meals, Adarsh Love MD  •  [START ON 3/4/2017] insulin detemir (LEVEMIR) injection 50 Units, 50 Units, Subcutaneous, QAM, Adarsh Love MD  •  insulin detemir (LEVEMIR) injection 60 Units, 60 Units, Subcutaneous, Nightly, Adarsh Love MD  •  lidocaine (XYLOCAINE) 1 % injection 10 mL, 10 mL, Infiltration, Once, Kiko Marie MD, 10 mL at 02/26/17 1024  •  lisinopril (PRINIVIL,ZESTRIL) tablet 20 mg, 20 mg, Oral, Daily, Oscar Lucas MD, 20 mg at 03/03/17 1000  •  metFORMIN (GLUCOPHAGE) tablet 1,000 mg, 1,000 mg, Oral, BID With Meals, Adarsh Love MD, 1,000 mg at 03/03/17 1000  •  morphine injection 2 mg, 2 mg, Intravenous, Q2H PRN, 2 mg at 03/03/17 1337 **AND** naloxone (NARCAN) injection 0.4 mg, 0.4 mg, Intravenous, Q5 Min PRN, Oscar Lucas MD  •  NIFEdipine XL (PROCARDIA XL) 24 hr tablet 60 mg, 60 mg, Oral, Q24H, Rene Levin MD, 60 mg at 03/03/17 1000  •  ondansetron (ZOFRAN) tablet 4 mg, 4 mg, Oral, Q6H PRN **OR** ondansetron ODT (ZOFRAN-ODT) disintegrating tablet 4 mg, 4 mg, Oral, Q6H PRN **OR** ondansetron (ZOFRAN) injection 4 mg, 4 mg, Intravenous, Q6H PRN, Oscar  "Oswald Lucas MD, 4 mg at 03/03/17 1337  •  pantoprazole (PROTONIX) EC tablet 40 mg, 40 mg, Oral, Q AM, Oscar Lucas MD, 40 mg at 03/03/17 0556  •  Pharmacy Consult, , Does not apply, Continuous PRN, Adarsh Love MD  •  pravastatin (PRAVACHOL) tablet 20 mg, 20 mg, Oral, Nightly, Oscar Lucas MD, 20 mg at 03/02/17 2139  •  rifAMPin (RIFADIN) capsule 300 mg, 300 mg, Oral, Q12H, Oscar Lucas MD, 300 mg at 03/03/17 1000  •  sertraline (ZOLOFT) tablet 100 mg, 100 mg, Oral, Daily, Oscar Lucas MD, 100 mg at 03/03/17 1000  •  sodium chloride 0.9 % flush 1-10 mL, 1-10 mL, Intravenous, PRN, Oscar Lucas MD  •  sodium chloride 0.9 % flush 10 mL, 10 mL, Intravenous, PRN, Trae Franklin MD, 10 mL at 02/25/17 2106    All systems were reviewed and negative except for:  Musculoskeletal: positive for  joint pain  Cardiac: no chest pain or chest discomfort, dizziness, palpitations.  RESP: no SOB   GI: n/v      Visit Vitals   • /86 (BP Location: Left arm, Patient Position: Lying)   • Pulse 98   • Temp 98.7 °F (37.1 °C) (Oral)   • Resp 18   • Ht 74\" (188 cm)   • Wt 258 lb (117 kg)   • SpO2 94%   • BMI 33.13 kg/m2          Intake/Output Summary (Last 24 hours) at 03/03/17 1742  Last data filed at 03/03/17 1600   Gross per 24 hour   Intake    800 ml   Output   1475 ml   Net   -675 ml          Wt Readings from Last 1 Encounters:   02/25/17 1705 258 lb (117 kg)       Physical Examination: By         Physical Exam    General: No acute distress.    Skin: Warm and dry, no diaphoresis noted   HEENT: No ptosis;  oral mucosa moist   Neck: Supple; no carotid bruits; no JVD, Trachea mid line   Heart: S1S2  regular rate and rhythm;  no murmurs; no gallop or rub appreciated, No thrills palpable   Chest: Respirations unlabored at rest, bilateral breath sounds have good air entry; no  wheezes auscultated.     Abdomen: Soft, non-tender, non-distended, " positive bowel sounds  ,No aneurysms palpable   Extremities: Bilateral lower extremities have no pre-tibial pitting edema; infected foot is in bandage, Radials are palpable   Neurological: Alert and oriented ; no new motor deficits,       Lab Review:  Personally reviewed the labs, radiology imaging and other cardiac procedures.       Results from last 7 days  Lab Units 03/03/17  0545  02/25/17  1723   SODIUM mmol/L 135*  < > 126*   POTASSIUM mmol/L 4.8  < > 4.8   CHLORIDE mmol/L 102  < > 87*   TOTAL CO2 mmol/L 20.1*  < > 19.0*   BUN mg/dL 40*  < > 30*   CREATININE mg/dL 1.16  < > 1.43*   CALCIUM mg/dL 8.8  < > 9.0   BILIRUBIN mg/dL  --   --  0.5   ALK PHOS U/L  --   --  136*   ALT (SGPT) U/L  --   --  10   AST (SGOT) U/L  --   --  14   GLUCOSE mg/dL 210*  < > 604*   < > = values in this interval not displayed.      @LABRCNTbnp@    Results from last 7 days  Lab Units 03/03/17  0545 03/02/17  0523 03/01/17  0631   WBC 10*3/mm3 10.92* 10.60 10.58   HEMOGLOBIN g/dL 9.0* 8.6* 8.6*   HEMATOCRIT % 27.8* 26.6* 25.9*   PLATELETS 10*3/mm3 329 325 330           Estimated Creatinine Clearance: 85.1 mL/min (by C-G formula based on Cr of 1.16).    I personally viewed and interpreted the patient's EKG/Telemetry data    ECG: SR with PVCs    ANASTASIA performed on 2/28 showed normal right and left ventricular function.  Normally functioning mitral, tricuspid, aortic and pulmonary valves with no evidence of any endocarditis or abscess. No clots in the LV. Aortic root is normal in size    Echocardiogram:   On 2/15  Interpretation Summary      · All left ventricular wall segments contract normally.  · Left ventricular function is normal. Estimated EF = 70%.  · Left atrial cavity size is borderline dilated.     CXR 2/26;    FINDINGS: Right upper extremity PICC line is seen with its tip overlying  the SVC. Heart size is within normal limits. There is some minimal  patchy infiltrate or chronic parenchymal change in the left infrahilar  region  similar to the 02/25/2017 study.          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   • Recent MSSA (methicillin susceptible Staphylococcus aureus) septicemia   • Infection of prosthetic right knee joint   • Hyperglycemia   • Hyponatremia   • Type 2 diabetes mellitus with hyperglycemia     left first toe amputation for osteomyelitis  S/p ANASTASIA    Assessment/ Plan :  - Asked for ANASTASIA transesophageal echocardiogram by ID: 2/28 no reported PFO, clot, vegetation, or mass  - Hypertension- takes lisinopril, Procardia, chlorthalidone  - Hyperlipidemia-takes statin  - LV EF 70% by echo 2/15/217 - PVC 6 beat by telemetry 3/3 (mag and +K fine)   6 beat PVC 3/2     S/p ANASTASIA 2/28 neg: He had a normal Cardiolite stress test 10/7/2015, and EF 50%.  Lipid profile: LDL is 78    Patient denies chest pain or chest discomfort. EKG shows sinus, no acute ST-T abnormality.   His BP is still running high. He had n/v vomiting sometime after his morning meds given. Takes PO lisinopril 20 mg by mouth daily, procardia 60 mg PO daily and chlorthalidone 25 mg by mouth daily.  His renal insufficiency improved.  Creat is 1.16 still, stable    Will add procardia 30 mg once this evening to control his blood pressure.  May consider switch to beta blocker, if not controlled by CCB     Ongoing management by Dr. PEGGY Levin-Tracy Levin MD, FACC  3/3/53371:21 AM      Patient is personally examined by me. All labs, radiology reports and cardiac procedures are reviewed and  or obtained by me. Asessment and plan is by me.-----Junior Levin MD     0 = independent

## 2019-03-12 NOTE — PHYSICAL THERAPY INITIAL EVALUATION ADULT - MD ORDER
Date:November 2, 2017      Patient was self referred, no letter generated. Do not send.        [facility] Health Information       OOB, WBAT

## 2019-03-12 NOTE — CONSULT NOTE ADULT - SUBJECTIVE AND OBJECTIVE BOX
49 yo m pmh type 2 DM - diet controlled, osteoarthritis of the hip POD # 0 from right total hip arthroscopy . Called to evaluate and for comanagement    PMH: type 2 DM diet controlled  PSH: Left shoulder surgery   Allergies: none  Social: from home, lives alone, former smoker 1ppd x 20 years. no recreational drug or alcohol use . no flu vaccination this year.   FH: non contributory                           12.1   16.15 )-----------( 204      ( 12 Mar 2019 11:38 )             36.2     12 Mar 2019 11:37    142    |  110    |  16     ----------------------------<  146    4.1     |  26     |  0.99     Ca    7.9        12 Mar 2019 11:37          CAPILLARY BLOOD GLUCOSE      POCT Blood Glucose.: 146 mg/dL (12 Mar 2019 12:19)  POCT Blood Glucose.: 93 mg/dL (12 Mar 2019 07:51)    Physical Exam: Patient refused.

## 2019-03-13 ENCOUNTER — TRANSCRIPTION ENCOUNTER (OUTPATIENT)
Age: 49
End: 2019-03-13

## 2019-03-13 VITALS
HEART RATE: 92 BPM | TEMPERATURE: 98 F | RESPIRATION RATE: 17 BRPM | OXYGEN SATURATION: 98 % | SYSTOLIC BLOOD PRESSURE: 132 MMHG | DIASTOLIC BLOOD PRESSURE: 78 MMHG

## 2019-03-13 LAB
ANION GAP SERPL CALC-SCNC: 4 MMOL/L — LOW (ref 5–17)
BUN SERPL-MCNC: 17 MG/DL — SIGNIFICANT CHANGE UP (ref 7–23)
CALCIUM SERPL-MCNC: 7.8 MG/DL — LOW (ref 8.5–10.1)
CHLORIDE SERPL-SCNC: 106 MMOL/L — SIGNIFICANT CHANGE UP (ref 96–108)
CO2 SERPL-SCNC: 31 MMOL/L — SIGNIFICANT CHANGE UP (ref 22–31)
CREAT SERPL-MCNC: 1.1 MG/DL — SIGNIFICANT CHANGE UP (ref 0.5–1.3)
GLUCOSE SERPL-MCNC: 100 MG/DL — HIGH (ref 70–99)
HCT VFR BLD CALC: 33.4 % — LOW (ref 39–50)
HGB BLD-MCNC: 11.1 G/DL — LOW (ref 13–17)
MCHC RBC-ENTMCNC: 29.3 PG — SIGNIFICANT CHANGE UP (ref 27–34)
MCHC RBC-ENTMCNC: 33.2 GM/DL — SIGNIFICANT CHANGE UP (ref 32–36)
MCV RBC AUTO: 88.1 FL — SIGNIFICANT CHANGE UP (ref 80–100)
NRBC # BLD: 0 /100 WBCS — SIGNIFICANT CHANGE UP (ref 0–0)
PLATELET # BLD AUTO: 196 K/UL — SIGNIFICANT CHANGE UP (ref 150–400)
POTASSIUM SERPL-MCNC: 3.8 MMOL/L — SIGNIFICANT CHANGE UP (ref 3.5–5.3)
POTASSIUM SERPL-SCNC: 3.8 MMOL/L — SIGNIFICANT CHANGE UP (ref 3.5–5.3)
RBC # BLD: 3.79 M/UL — LOW (ref 4.2–5.8)
RBC # FLD: 12.7 % — SIGNIFICANT CHANGE UP (ref 10.3–14.5)
SODIUM SERPL-SCNC: 141 MMOL/L — SIGNIFICANT CHANGE UP (ref 135–145)
WBC # BLD: 9.76 K/UL — SIGNIFICANT CHANGE UP (ref 3.8–10.5)
WBC # FLD AUTO: 9.76 K/UL — SIGNIFICANT CHANGE UP (ref 3.8–10.5)

## 2019-03-13 PROCEDURE — 97116 GAIT TRAINING THERAPY: CPT

## 2019-03-13 PROCEDURE — 97162 PT EVAL MOD COMPLEX 30 MIN: CPT

## 2019-03-13 PROCEDURE — 99232 SBSQ HOSP IP/OBS MODERATE 35: CPT

## 2019-03-13 PROCEDURE — C1776: CPT

## 2019-03-13 PROCEDURE — 36415 COLL VENOUS BLD VENIPUNCTURE: CPT

## 2019-03-13 PROCEDURE — 88305 TISSUE EXAM BY PATHOLOGIST: CPT

## 2019-03-13 PROCEDURE — 80048 BASIC METABOLIC PNL TOTAL CA: CPT

## 2019-03-13 PROCEDURE — 97530 THERAPEUTIC ACTIVITIES: CPT

## 2019-03-13 PROCEDURE — 88311 DECALCIFY TISSUE: CPT

## 2019-03-13 PROCEDURE — 85027 COMPLETE CBC AUTOMATED: CPT

## 2019-03-13 PROCEDURE — 82962 GLUCOSE BLOOD TEST: CPT

## 2019-03-13 PROCEDURE — 76000 FLUOROSCOPY <1 HR PHYS/QHP: CPT

## 2019-03-13 PROCEDURE — 97165 OT EVAL LOW COMPLEX 30 MIN: CPT

## 2019-03-13 RX ORDER — ASPIRIN/CALCIUM CARB/MAGNESIUM 324 MG
1 TABLET ORAL
Qty: 60 | Refills: 0
Start: 2019-03-13 | End: 2019-04-11

## 2019-03-13 RX ORDER — OXYCODONE HYDROCHLORIDE 5 MG/1
1 TABLET ORAL
Qty: 28 | Refills: 0
Start: 2019-03-13 | End: 2019-03-19

## 2019-03-13 RX ORDER — TRAMADOL HYDROCHLORIDE 50 MG/1
1 TABLET ORAL
Qty: 0 | Refills: 0 | COMMUNITY

## 2019-03-13 RX ORDER — ACETAMINOPHEN 500 MG
1000 TABLET ORAL ONCE
Qty: 0 | Refills: 0 | Status: DISCONTINUED | OUTPATIENT
Start: 2019-03-13 | End: 2019-03-13

## 2019-03-13 RX ADMIN — Medication 1 MILLIGRAM(S): at 12:27

## 2019-03-13 RX ADMIN — ZOLPIDEM TARTRATE 5 MILLIGRAM(S): 10 TABLET ORAL at 00:29

## 2019-03-13 RX ADMIN — OXYCODONE HYDROCHLORIDE 10 MILLIGRAM(S): 5 TABLET ORAL at 02:00

## 2019-03-13 RX ADMIN — HYDROMORPHONE HYDROCHLORIDE 0.5 MILLIGRAM(S): 2 INJECTION INTRAMUSCULAR; INTRAVENOUS; SUBCUTANEOUS at 04:37

## 2019-03-13 RX ADMIN — HYDROMORPHONE HYDROCHLORIDE 0.5 MILLIGRAM(S): 2 INJECTION INTRAMUSCULAR; INTRAVENOUS; SUBCUTANEOUS at 08:46

## 2019-03-13 RX ADMIN — Medication 325 MILLIGRAM(S): at 08:06

## 2019-03-13 RX ADMIN — OXYCODONE HYDROCHLORIDE 10 MILLIGRAM(S): 5 TABLET ORAL at 09:06

## 2019-03-13 RX ADMIN — HYDROMORPHONE HYDROCHLORIDE 0.5 MILLIGRAM(S): 2 INJECTION INTRAMUSCULAR; INTRAVENOUS; SUBCUTANEOUS at 13:12

## 2019-03-13 RX ADMIN — HYDROMORPHONE HYDROCHLORIDE 0.5 MILLIGRAM(S): 2 INJECTION INTRAMUSCULAR; INTRAVENOUS; SUBCUTANEOUS at 09:00

## 2019-03-13 RX ADMIN — HYDROMORPHONE HYDROCHLORIDE 0.5 MILLIGRAM(S): 2 INJECTION INTRAMUSCULAR; INTRAVENOUS; SUBCUTANEOUS at 05:00

## 2019-03-13 RX ADMIN — OXYCODONE HYDROCHLORIDE 10 MILLIGRAM(S): 5 TABLET ORAL at 12:27

## 2019-03-13 RX ADMIN — PANTOPRAZOLE SODIUM 40 MILLIGRAM(S): 20 TABLET, DELAYED RELEASE ORAL at 06:00

## 2019-03-13 RX ADMIN — Medication 100 MILLIGRAM(S): at 06:01

## 2019-03-13 RX ADMIN — Medication 100 MILLIGRAM(S): at 01:04

## 2019-03-13 RX ADMIN — ENOXAPARIN SODIUM 40 MILLIGRAM(S): 100 INJECTION SUBCUTANEOUS at 12:27

## 2019-03-13 RX ADMIN — OXYCODONE HYDROCHLORIDE 10 MILLIGRAM(S): 5 TABLET ORAL at 01:02

## 2019-03-13 RX ADMIN — OXYCODONE HYDROCHLORIDE 10 MILLIGRAM(S): 5 TABLET ORAL at 08:06

## 2019-03-13 RX ADMIN — Medication 325 MILLIGRAM(S): at 12:27

## 2019-03-13 RX ADMIN — Medication 500 MILLIGRAM(S): at 06:01

## 2019-03-13 RX ADMIN — Medication 1 TABLET(S): at 12:27

## 2019-03-13 NOTE — PROGRESS NOTE ADULT - SUBJECTIVE AND OBJECTIVE BOX
The patient was interviewed and evaluated  48y Male    T(C): 36.8 (03-13-19 @ 08:37), Max: 37.2 (03-12-19 @ 20:33)  HR: 92 (03-13-19 @ 08:37) (67 - 92)  BP: 132/78 (03-13-19 @ 08:37) (120/80 - 148/77)  RR: 17 (03-13-19 @ 08:37) (15 - 18)  SpO2: 98% (03-13-19 @ 08:37) (94% - 99%)  Wt(kg): --    Pt seen, doing well, no anesthesia complications or complaints noted or reported.   No Nausea    All questions answered    No additional recommendations.     Pain well controlled

## 2019-03-13 NOTE — PROGRESS NOTE ADULT - SUBJECTIVE AND OBJECTIVE BOX
Orthopaedic Surgery Post-Operative Progress Note    Pt reports pain is well controlled. Tolerated procedure well. Denies fevers, dizziness, CP, SOB, N/V, numbness/tingling, calf pain.    Labs:             CBC Full  -  ( 13 Mar 2019 06:06 )  WBC Count : 9.76 K/uL  Hemoglobin : 11.1 g/dL  Hematocrit : 33.4 %  Platelet Count - Automated : 196 K/uL  Mean Cell Volume : 88.1 fl  Mean Cell Hemoglobin : 29.3 pg  Mean Cell Hemoglobin Concentration : 33.2 gm/dL  Auto Neutrophil # : x  Auto Lymphocyte # : x  Auto Monocyte # : x  Auto Eosinophil # : x  Auto Basophil # : x  Auto Neutrophil % : x  Auto Lymphocyte % : x  Auto Monocyte % : x  Auto Eosinophil % : x  Auto Basophil % : x      Vital Signs Last 24 Hrs  T(C): 37.1 (13 Mar 2019 04:32), Max: 37.2 (12 Mar 2019 20:33)  T(F): 98.8 (13 Mar 2019 04:32), Max: 99 (12 Mar 2019 20:33)  HR: 86 (13 Mar 2019 04:32) (67 - 88)  BP: 148/77 (13 Mar 2019 04:32) (120/80 - 148/77)  BP(mean): --  RR: 17 (13 Mar 2019 04:32) (15 - 18)  SpO2: 99% (13 Mar 2019 04:32) (94% - 99%)    Physical Exam:  General: NAD, Resting Comfortably    RLE:  Dressing Clean, Dry, Intact  SILT L2-S1  Gsc/TA/EHL/FHL Intact  DP/PT 2+  Compartments Soft and Compressible  No calf tenderness

## 2019-03-13 NOTE — PROGRESS NOTE ADULT - SUBJECTIVE AND OBJECTIVE BOX
Patient is a 48y old  Male who presents with a chief complaint of R FRANSISCO (13 Mar 2019 09:59)      INTERVAL HPI/OVERNIGHT EVENTS: a/p: 47 yo m pmh type 2 DM - diet controlled, osteoarthritis of the hip POD # 0 from right total hip arthroscopy. pt does not want to be examined at this time. Currently hemodynamically stable with no complaints. Will reassess in am. No active concerns.    Pain Location & Control: controlled     MEDICATIONS  (STANDING):  acetaminophen  IVPB .. 1000 milliGRAM(s) IV Intermittent once  ascorbic acid 500 milliGRAM(s) Oral two times a day  docusate sodium 100 milliGRAM(s) Oral three times a day  enoxaparin Injectable 40 milliGRAM(s) SubCutaneous every 24 hours  ferrous    sulfate 325 milliGRAM(s) Oral three times a day with meals  folic acid 1 milliGRAM(s) Oral daily  lactated ringers. 1000 milliLiter(s) (75 mL/Hr) IV Continuous <Continuous>  multivitamin 1 Tablet(s) Oral daily  pantoprazole    Tablet 40 milliGRAM(s) Oral before breakfast  polyethylene glycol 3350 17 Gram(s) Oral daily    MEDICATIONS  (PRN):  acetaminophen   Tablet .. 650 milliGRAM(s) Oral every 6 hours PRN Temp greater or equal to 38C (100.4F)  aluminum hydroxide/magnesium hydroxide/simethicone Suspension 30 milliLiter(s) Oral four times a day PRN Indigestion  HYDROmorphone  Injectable 0.5 milliGRAM(s) IV Push every 3 hours PRN breakthrough pain  ondansetron Injectable 4 milliGRAM(s) IV Push every 6 hours PRN Nausea and/or Vomiting  oxyCODONE    IR 5 milliGRAM(s) Oral every 4 hours PRN Moderate Pain (4 - 6)  oxyCODONE    IR 10 milliGRAM(s) Oral every 4 hours PRN Severe Pain (7 - 10)  senna 2 Tablet(s) Oral at bedtime PRN Constipation  traMADol 50 milliGRAM(s) Oral every 6 hours PRN Mild Pain (1 - 3)  zolpidem 5 milliGRAM(s) Oral at bedtime PRN Insomnia  zolpidem 5 milliGRAM(s) Oral at bedtime PRN Insomnia      Allergies    No Known Allergies    Intolerances        REVIEW OF SYSTEMS:  CONSTITUTIONAL: No fever, weight loss, or fatigue  EYES: No eye pain, visual disturbances, or discharge  ENMT:  No difficulty hearing, tinnitus, vertigo; No sinus or throat pain  NECK: No pain or stiffness  BREASTS: No pain, masses, or nipple discharge  RESPIRATORY: No cough, wheezing, chills or hemoptysis; No shortness of breath  CARDIOVASCULAR: No chest pain, palpitations, dizziness, or leg swelling  GASTROINTESTINAL: No abdominal or epigastric pain. No nausea, vomiting, or hematemesis; No diarrhea or constipation. No melena or hematochezia.  GENITOURINARY: No dysuria, frequency, hematuria, or incontinence  NEUROLOGICAL: No headaches, memory loss, loss of strength, numbness, or tremors  SKIN: No itching, burning, rashes, or lesions   LYMPH NODES: No enlarged glands  ENDOCRINE: No heat or cold intolerance; No hair loss; No polydipsia or polyuria  MUSCULOSKELETAL: No back pain  PSYCHIATRIC: No depression, anxiety, mood swings, or difficulty sleeping  HEME/LYMPH: No easy bruising, or bleeding gums  ALLERGY AND IMMUNOLOGIC: No hives or eczema    Vital Signs Last 24 Hrs  T(C): 36.8 (13 Mar 2019 08:37), Max: 37.2 (12 Mar 2019 20:33)  T(F): 98.2 (13 Mar 2019 08:37), Max: 99 (12 Mar 2019 20:33)  HR: 92 (13 Mar 2019 08:37) (67 - 92)  BP: 132/78 (13 Mar 2019 08:37) (120/80 - 148/77)  BP(mean): --  RR: 17 (13 Mar 2019 08:37) (15 - 18)  SpO2: 98% (13 Mar 2019 08:37) (94% - 99%)    PHYSICAL EXAM:  GENERAL: NAD, well-groomed, well-developed  HEAD:  Atraumatic, Normocephalic  EYES: EOMI, PERRLA, conjunctiva and sclera clear  ENMT: No tonsillar erythema, exudates, or enlargement; Moist mucous membranes, Good dentition, No lesions  NECK: Supple, No JVD, Normal thyroid  NERVOUS SYSTEM:  Alert & Oriented X3, Good concentration; Motor Strength 5/5 B/L upper and lower extremities; DTRs 2+ intact and symmetric  CHEST/LUNG: Clear to auscultation bilaterally; No rales, rhonchi, wheezing, or rubs  HEART: Regular rate and rhythm; No murmurs, rubs, or gallops  ABDOMEN: Soft, Nontender, Nondistended; Bowel sounds present  EXTREMITIES:  2+ Peripheral Pulses, No clubbing or cyanosis  LYMPH: No lymphadenopathy noted  SKIN: No rashes or lesions  INCISION:  Dressing dry and intact    LABS:                        11.1   9.76  )-----------( 196      ( 13 Mar 2019 06:06 )             33.4     13 Mar 2019 06:06    141    |  106    |  17     ----------------------------<  100    3.8     |  31     |  1.10     Ca    7.8        13 Mar 2019 06:06          CAPILLARY BLOOD GLUCOSE      POCT Blood Glucose.: 146 mg/dL (12 Mar 2019 12:19)      RADIOLOGY & ADDITIONAL TESTS:    Imaging Personally Reviewed:  [x ] YES  [ ] NO    Consultant(s) Notes Reviewed:  [x ] YES  [ ] NO    Care Discussed with Consultants/Other Providers [x ] YES  [ ] NO

## 2019-03-13 NOTE — PROGRESS NOTE ADULT - ASSESSMENT
a/p: 49 yo m pmh type 2 DM - diet controlled, osteoarthritis of the hip POD # 1 from right THR. stable cont pain control with IV Dilaudid and oxycodone prn, cont DVT ppx with Lovenox SC.     Acute anemia 2/2 acute blood loss, asymptomatic cont po Iron supplement. monitor cbc    Leukocytosis : post op reactive, resolved.

## 2019-03-13 NOTE — DISCHARGE NOTE PROVIDER - HOSPITAL COURSE
The patient is a 48 year old Male status post elective Anterior Total Hip Arthroplasty to the Right hip after failing outpatient non-operative conservative management.  Patient presented to [facility] after being medically cleared for an elective surgical procedure. The patient was taken to the operating room on date mentioned above. Prophylactic antibiotics were started before the procedure and continued for 24 hours.  There were no complications during the procedure and patient tolerated the procedure well.  The patient was transferred to recovery room in stable condition and subsequently to surgical floor.  Patient was placed Lovenox 40 mg qd for anticoagulation.  All home medications were continued. The patient received physical therapy daily and daily labs were followed.  The dressing was kept clean, dry, intact.  The rest of the hospital stay was unremarkable.

## 2019-03-13 NOTE — DISCHARGE NOTE NURSING/CASE MANAGEMENT/SOCIAL WORK - NSDCDPATPORTLINK_GEN_ALL_CORE
You can access the [facility] Patient Portal, offered by [facility], by registering with the following website: [email or web address]

## 2019-03-13 NOTE — PROGRESS NOTE ADULT - ASSESSMENT
Pt is a 48y Male POD 1 from R Ant FRANSISCO.    -Pain Control  -DVT PPx Starting today  -Continue Post-Op Abx   -Encourage Incentive Spirometry  -WBAT RLE/LLE  -PT/OT  -Med Management  -Dispo Planning  -Will discuss with attending and advise if plan changes.

## 2019-03-13 NOTE — DISCHARGE NOTE PROVIDER - NSDCCPCAREPLAN_GEN_ALL_CORE_FT
PRINCIPAL DISCHARGE DIAGNOSIS  Problem: Primary osteoarthritis of right hip  Assessment and Plan of Treatment: 1.	Pain Control  2.	Walking with full weight bearing as tolerated, with assistive devices (walker/Cane as Needed)  3.	DVT Prophylaxis for 30 days.  twice daily.  4.	PT as needed  5.	Follow up with Dr. Marin as outpatient in 10-14 Days after discharge from the hospital or rehab. Call office for appointment.  6.	Remove Staples Post-Op Day 14, and Remove Dressing Post-Op Day 10, with Daily Dressing Changes as Need.  7.	Ice affected area as Needed  8.	Keep Dressing clean and dry.

## 2019-06-03 NOTE — PATIENT PROFILE ADULT. - PAIN SCALE PREFERRED, PROFILE
Continuity of Care Form    Patient Name: Odessa Valladares   :  1950  MRN:  [id number]    Admit date:  2019  Discharge date:  2019    Code Status Order: Full Code   Advance Directives:   Advance Care Flowsheet Documentation     Date/Time Healthcare Directive Type of Healthcare Directive Copy in [address] Agent's Name Healthcare Agent's Phone Number    [phone]  Yes, patient has an advance directive for healthcare treatment  Living will  No, copy requested from family  [facility]  ? Admitting Physician:  Davonte Brizuela DO  PCP: Shahid Stanley DO    Discharging Nurse: [facility] Unit/Room#: 5745/7862-U  Discharging Unit Phone Number: [phone]      Emergency Contact:   Extended Emergency Contact Information  Primary Emergency Contact: Essence Hugo  Address: [address] [place] [address] Phone: [phone]  Mobile Phone: [phone]  Relation: Spouse   needed? No    Past Surgical History:  History reviewed. No pertinent surgical history. Immunization History: There is no immunization history on file for this patient.     Active Problems:  Patient Active Problem List   Diagnosis Code    COPD exacerbation ([facility]) J44.1       Isolation/Infection:   Isolation          No Isolation            Nurse Assessment:  Last Vital Signs: BP 94/62 Comment: manual  Pulse 90   Temp 97.4 °F (36.3 °C) (Oral)   Resp 18   Ht 5' 8\" (1.727 m)   Wt (!) 357 lb 0.3 oz (161.9 kg)   SpO2 93%   BMI 54.28 kg/m²     Last documented pain score (0-10 scale): Pain Level: 0  Last Weight:   Wt Readings from Last 1 Encounters:   19 (!) 357 lb 0.3 oz (161.9 kg)     Mental Status:  oriented    IV Access:  - None    Nursing Mobility/ADLs:  Walking   Dependent  Transfer  Assisted  Bathing  Dependent  Dressing  Dependent  Toileting  Dependent  Feeding Assisted  Med Admin  Assisted  Med Delivery   whole    Wound Care Documentation and Therapy:        Elimination:  Continence:   · Bowel: no}  · Bladder: Yes  Urinary Catheter: None   Colostomy/Ileostomy/Ileal Conduit: No       Date of Last BM: 6/5/19      Intake/Output Summary (Last 24 hours) at 6/3/2019 1559  Last data filed at 6/3/2019 1456  Gross per 24 hour   Intake 210 ml   Output 1600 ml   Net -1390 ml     I/O last 3 completed shifts: In: 210 [P.O.:200; I.V.:10]  Out: 1600 [Urine:1600]    Safety Concerns: At Risk for Falls    Impairments/Disabilities:      None    Nutrition Therapy:  Current Nutrition Therapy:   - Oral Diet:  Carb Control 4 carbs/meal (1800kcals/day)    Routes of Feeding: None  Liquids: No Restrictions  Daily Fluid Restriction: no  Last Modified Barium Swallow with Video (Video Swallowing Test): not done    Treatments at the Time of Hospital Discharge:   Respiratory Treatments:     Oxygen Therapy:  is on oxygen at 3-4 L/min per nasal cannula.   Ventilator:    - No ventilator support    Rehab Therapies: Physical Therapy and Occupational Therapy  Weight Bearing Status/Restrictions: No weight bearing restirctions  Other Medical Equipment (for information only, NOT a DME order):  bedside commode  Other Treatments: ***Bi Pap at HS    Patient's personal belongings (please select all that are sent with patient):  None    RN SIGNATURE:  Kike Esqueda RN    CASE MANAGEMENT/SOCIAL WORK SECTION    Inpatient Status Date: ***    Readmission Risk Assessment Score:  Readmission Risk              Risk of Unplanned Readmission:        13           Discharging to Facility/ Agency   · Name: [facility]  · [address]  · Phone:[phone]  · Fax:[phone]    Dialysis Facility (if applicable)   · Name:  · Address:  · Dialysis Schedule:  · Phone:  · Fax:    / signature: Electronically signed by ARCHANA Fernandez on 6/4/19 at 8:23 numerical 0-10

## 2019-07-07 NOTE — CONSULT NOTE ADULT - PROBLEM/RECOMMENDATION-4
DISPLAY PLAN FREE TEXT
DISPLAY PLAN FREE TEXT
Abdomen soft, non-tender and non-distended, no rebound, no guarding and no masses. no hepatosplenomegaly.

## 2019-10-29 NOTE — H&P ADULT - NSHPRISKHIVSCREEN_GEN_ALL_CORE
Physical Therapy Progress Note     Name: Jessica HADLEY  Clinic Number: [id number]    Therapy Diagnosis:   Encounter Diagnoses   Name Primary?    Joint stiffness     Muscle weakness     Back pain, unspecified back location, unspecified back pain laterality, unspecified chronicity      Physician: Naheed Mccabe FNP-C    Visit Date: 10/29/2019  Physician Orders: PT Eval and Treat   Medical Diagnosis from Referral:   M51.36 (ICD-10-CM) - Degeneration of lumbar intervertebral disc   G89.4 (ICD-10-CM) - Chronic pain syndrome   M25.552 (ICD-10-CM) - Left hip pain   M77.11 (ICD-10-CM) - Right lateral epicondylitis         Evaluation Date: 8/15/2019  Plan of Care Expiration: 11/15/19     Authorization Period Expiration: 11/3/19  Visit # / Visits authorized: 6/10    Time In: 0700  Time Out: 0805    Total Billable Time: 40 minutes    Precautions: standard     Subjective     Pt states the back is doing better today.   She was fairly compliant with home exercise program.  Response to previous treatment: fair  Functional change: none    Pain: 3/10  Location:  Lumbar     Objective     Lumbar AROM: Degrees   Flexion 58   Extension 12   Right side bending 15   Left side bending 15   Lumbar quadrant reveals: increase in discomfort in all planes     AROM: Bilateral LE: Grossly WFL  MMT:  Right LE: 4   Left LE: 4  Abdominal Strength: 4-    JESSICA received therapeutic exercises to develop strength, endurance, ROM, flexibility, posture and core stabilization for 40 minutes including:     -LTR x 20  - (shift correction) left to right x 20  -TrA recruitment 2 x 10 with hip flex/ext   -piriformis stretch 20 sec x 4  -resisted trunk rotation in hooklying GTB 2 x 15  -resisted anti rotation standing with GTB 2 x 15  -bird dog 2 x 10  -standing bilateral shoulder ext/rows RTB 2 x 15  -resisted open book with RTB 2 x 15  -squats with t-ball vs wall with ADIM 3 x 10     JESSICA received the following manual therapy techniques:  were applied  to the: lumbar spine for 10 minutes, including:  -lumbar distraction in hooklying with belt    CP + IFC x 12 min post treatment L/S.  NP        Education provided:   - HEP compliance      Written Home Exercises Provided: Patient instructed to cont prior HEP.  Exercises were reviewed and JESSICA was able to demonstrate them prior to the end of the session.  JESSICA demonstrated good  understanding of the education provided.     See EMR under Patient Instructions for exercises provided 10/29/2019.    Assessment     Good performance with abdominal response with progression of dynamic stabilization emphasis.  No c/o increased discomfort with prescribed activities. Decreased symptoms reported post treatment.   JESSICA is progressing well towards her goals.     Pt prognosis is Good.     Pt will continue to benefit from skilled outpatient physical therapy to address the deficits listed in the problem list box on initial evaluation, provide pt/family education and to maximize pt's level of independence in the home and community environment.     Pt's spiritual, cultural and educational needs considered and pt agreeable to plan of care and goals.     Anticipated barriers to physical therapy:     Short Term Goals (8 Weeks):   Updated 10/29/19  Partially MET    1.Pt to increase strength by a 1/2 grade of muscles test to allow for improvement in functional activities such as performing chores. MET  2.Pt to improve range of motion by 50% to allow for improved functional mobility to allow for improvement in IADLs. Not MET  3.Pt to report compliance with HEP and demonstrate proper exercise technique to PT to show competence with self management of condition. MET  4.Decrease pain by 50% during functional activities. Inconsistently MeT    Long Term Goals (12 Weeks):     1. Increase ROM to allow improved joint biomechanics during functional activities.   2.Increase trunk and lower extremity strength to within normal limits during  functional activities.   3. Independent with home exercise program.   4. Full return to functional activities with manageable complaints.  5. Patient to demonstrate improved posture and body mechanics.  6. Decrease pain by 75% during functional activities.       Plan       Recommended Treatment Plan: 2-3 times per week for 12 weeks with treatments to consist of:  Neuromuscular and postural re-education,  training, therapeutic exercise, therapeutic activities,balance training, gait training, manual therapy, soft tissue mobilization, ROM exercises, Cardiovascular,  Postural stabilization, manual traction, spinal mobilization, moist heat, cryotherapy, electrical stimulation, ultrasound, home exercise education and planning.    Continue with established Plan of Care towards PT goals.     Sebastián Zepeda, PT    Offered and patient declined

## 2021-04-27 NOTE — PROVIDER CONTACT NOTE (CRITICAL VALUE NOTIFICATION) - RECOMMENDATIONS
BP well controlled on current meds.  Continue on same meds.    
normal saline bolus infusing and zosyn given
please advise

## 2021-06-07 PROBLEM — M16.11 UNILATERAL PRIMARY OSTEOARTHRITIS, RIGHT HIP: Chronic | Status: ACTIVE | Noted: 2019-02-27

## 2021-06-10 ENCOUNTER — OUTPATIENT (OUTPATIENT)
Dept: OUTPATIENT SERVICES | Facility: HOSPITAL | Age: 51
LOS: 1 days | End: 2021-06-10
Payer: MEDICAID

## 2021-06-10 VITALS — WEIGHT: 218.92 LBS | HEIGHT: 73 IN

## 2021-06-10 DIAGNOSIS — Z96.641 PRESENCE OF RIGHT ARTIFICIAL HIP JOINT: Chronic | ICD-10-CM

## 2021-06-10 DIAGNOSIS — Z98.890 OTHER SPECIFIED POSTPROCEDURAL STATES: Chronic | ICD-10-CM

## 2021-06-10 DIAGNOSIS — Z01.818 ENCOUNTER FOR OTHER PREPROCEDURAL EXAMINATION: ICD-10-CM

## 2021-06-10 DIAGNOSIS — S41.119A LACERATION WITHOUT FOREIGN BODY OF UNSPECIFIED UPPER ARM, INITIAL ENCOUNTER: Chronic | ICD-10-CM

## 2021-06-10 DIAGNOSIS — M16.12 UNILATERAL PRIMARY OSTEOARTHRITIS, LEFT HIP: ICD-10-CM

## 2021-06-10 LAB
ALBUMIN SERPL ELPH-MCNC: 3.6 G/DL — SIGNIFICANT CHANGE UP (ref 3.3–5)
ALP SERPL-CCNC: 83 U/L — SIGNIFICANT CHANGE UP (ref 40–120)
ALT FLD-CCNC: 17 U/L — SIGNIFICANT CHANGE UP (ref 12–78)
ANION GAP SERPL CALC-SCNC: 3 MMOL/L — LOW (ref 5–17)
APPEARANCE UR: CLEAR — SIGNIFICANT CHANGE UP
APTT BLD: 36.5 SEC — HIGH (ref 27.5–35.5)
AST SERPL-CCNC: 14 U/L — LOW (ref 15–37)
BILIRUB SERPL-MCNC: 0.2 MG/DL — SIGNIFICANT CHANGE UP (ref 0.2–1.2)
BILIRUB UR-MCNC: NEGATIVE — SIGNIFICANT CHANGE UP
BUN SERPL-MCNC: 16 MG/DL — SIGNIFICANT CHANGE UP (ref 7–23)
CALCIUM SERPL-MCNC: 8.1 MG/DL — LOW (ref 8.5–10.1)
CHLORIDE SERPL-SCNC: 111 MMOL/L — HIGH (ref 96–108)
CO2 SERPL-SCNC: 30 MMOL/L — SIGNIFICANT CHANGE UP (ref 22–31)
COLOR SPEC: YELLOW — SIGNIFICANT CHANGE UP
CREAT SERPL-MCNC: 0.87 MG/DL — SIGNIFICANT CHANGE UP (ref 0.5–1.3)
DIFF PNL FLD: ABNORMAL
GLUCOSE SERPL-MCNC: 89 MG/DL — SIGNIFICANT CHANGE UP (ref 70–99)
GLUCOSE UR QL: NEGATIVE — SIGNIFICANT CHANGE UP
HCT VFR BLD CALC: 42.1 % — SIGNIFICANT CHANGE UP (ref 39–50)
HGB BLD-MCNC: 14.2 G/DL — SIGNIFICANT CHANGE UP (ref 13–17)
INR BLD: 1.03 RATIO — SIGNIFICANT CHANGE UP (ref 0.88–1.16)
KETONES UR-MCNC: NEGATIVE — SIGNIFICANT CHANGE UP
LEUKOCYTE ESTERASE UR-ACNC: NEGATIVE — SIGNIFICANT CHANGE UP
MCHC RBC-ENTMCNC: 29.6 PG — SIGNIFICANT CHANGE UP (ref 27–34)
MCHC RBC-ENTMCNC: 33.7 GM/DL — SIGNIFICANT CHANGE UP (ref 32–36)
MCV RBC AUTO: 87.7 FL — SIGNIFICANT CHANGE UP (ref 80–100)
NITRITE UR-MCNC: NEGATIVE — SIGNIFICANT CHANGE UP
NRBC # BLD: 0 /100 WBCS — SIGNIFICANT CHANGE UP (ref 0–0)
PH UR: 5 — SIGNIFICANT CHANGE UP (ref 5–8)
PLATELET # BLD AUTO: 240 K/UL — SIGNIFICANT CHANGE UP (ref 150–400)
POTASSIUM SERPL-MCNC: 4 MMOL/L — SIGNIFICANT CHANGE UP (ref 3.5–5.3)
POTASSIUM SERPL-SCNC: 4 MMOL/L — SIGNIFICANT CHANGE UP (ref 3.5–5.3)
PROT SERPL-MCNC: 7.5 G/DL — SIGNIFICANT CHANGE UP (ref 6–8.3)
PROT UR-MCNC: NEGATIVE — SIGNIFICANT CHANGE UP
PROTHROM AB SERPL-ACNC: 12 SEC — SIGNIFICANT CHANGE UP (ref 10.6–13.6)
RBC # BLD: 4.8 M/UL — SIGNIFICANT CHANGE UP (ref 4.2–5.8)
RBC # FLD: 13.4 % — SIGNIFICANT CHANGE UP (ref 10.3–14.5)
SODIUM SERPL-SCNC: 144 MMOL/L — SIGNIFICANT CHANGE UP (ref 135–145)
SP GR SPEC: 1.02 — SIGNIFICANT CHANGE UP (ref 1.01–1.02)
UROBILINOGEN FLD QL: NEGATIVE — SIGNIFICANT CHANGE UP
WBC # BLD: 10.1 K/UL — SIGNIFICANT CHANGE UP (ref 3.8–10.5)
WBC # FLD AUTO: 10.1 K/UL — SIGNIFICANT CHANGE UP (ref 3.8–10.5)

## 2021-06-10 PROCEDURE — 85730 THROMBOPLASTIN TIME PARTIAL: CPT

## 2021-06-10 PROCEDURE — 86901 BLOOD TYPING SEROLOGIC RH(D): CPT

## 2021-06-10 PROCEDURE — 36415 COLL VENOUS BLD VENIPUNCTURE: CPT

## 2021-06-10 PROCEDURE — 73502 X-RAY EXAM HIP UNI 2-3 VIEWS: CPT | Mod: 26,LT

## 2021-06-10 PROCEDURE — 86900 BLOOD TYPING SEROLOGIC ABO: CPT

## 2021-06-10 PROCEDURE — 87640 STAPH A DNA AMP PROBE: CPT

## 2021-06-10 PROCEDURE — 85610 PROTHROMBIN TIME: CPT

## 2021-06-10 PROCEDURE — G0463: CPT

## 2021-06-10 PROCEDURE — 80053 COMPREHEN METABOLIC PANEL: CPT

## 2021-06-10 PROCEDURE — 83036 HEMOGLOBIN GLYCOSYLATED A1C: CPT

## 2021-06-10 PROCEDURE — 87086 URINE CULTURE/COLONY COUNT: CPT

## 2021-06-10 PROCEDURE — 73502 X-RAY EXAM HIP UNI 2-3 VIEWS: CPT

## 2021-06-10 PROCEDURE — 87641 MR-STAPH DNA AMP PROBE: CPT

## 2021-06-10 PROCEDURE — 71046 X-RAY EXAM CHEST 2 VIEWS: CPT

## 2021-06-10 PROCEDURE — 93005 ELECTROCARDIOGRAM TRACING: CPT

## 2021-06-10 PROCEDURE — 93010 ELECTROCARDIOGRAM REPORT: CPT

## 2021-06-10 PROCEDURE — 71046 X-RAY EXAM CHEST 2 VIEWS: CPT | Mod: 26

## 2021-06-10 PROCEDURE — 85027 COMPLETE CBC AUTOMATED: CPT

## 2021-06-10 PROCEDURE — 81001 URINALYSIS AUTO W/SCOPE: CPT

## 2021-06-10 PROCEDURE — 86850 RBC ANTIBODY SCREEN: CPT

## 2021-06-10 NOTE — H&P PST ADULT - NSICDXPASTMEDICALHX_GEN_ALL_CORE_FT
PAST MEDICAL HISTORY:  Unilateral primary osteoarthritis, left hip     Unilateral primary osteoarthritis, right hip

## 2021-06-10 NOTE — H&P PST ADULT - NSICDXPASTSURGICALHX_GEN_ALL_CORE_FT
PAST SURGICAL HISTORY:  Arm laceration (Repair of right forearm laceration 1995- tendon repair)    History of colonoscopy     S/P shoulder surgery (Left, 2002)    Status post hip replacement, right 2019

## 2021-06-10 NOTE — H&P PST ADULT - HISTORY OF PRESENT ILLNESS
49yo male with no PMH here for PST. Pt states he started to have right hip pain that started about 2 years ago radiating to right groin getting progressively worse. Pt rates pain to be 10/10 and pt was not taking any po analgesia. Pt states surgeon just prescribed Tramadol PRN and pt will pick it up today. Pt with limping gait and unable to ambulate for long periods of time due to pain.  Pt electing for right anterior total hip replacement on 3/12/19. 49 yo male with no pmh, presents to PST scheduled for a left anterior total hip replacement on 6/22 with Dr. Marin. C/O left hip pain from OA. Rates his pain today 8/10. IBU as needed. S/P right hip replacement in 2019, with no post-op issues.

## 2021-06-10 NOTE — H&P PST ADULT - NSICDXPROBLEM_GEN_ALL_CORE_FT
PROBLEM DIAGNOSES  Problem: Unilateral primary osteoarthritis, left hip  Assessment and Plan: left anterior total hip replacement on 6/22 with Dr. Marin     Problem: Pre-op evaluation  Assessment and Plan: Labs-CBC, CMP, PT/PTT, T&S, A1c, Nose cx, and EKG, CXR, hip X-ray. To make appt for COVID PCR 48-72 before DOS.   MC with PCP  Pre op and 3 day of Hibiclens instructions reviewed and given. Take routine am meds DOS with sip of water. Avoid NSAIDs and OTC supplements. Tylenol is ok. Verbalized understanding

## 2021-06-11 LAB
A1C WITH ESTIMATED AVERAGE GLUCOSE RESULT: 5.4 % — SIGNIFICANT CHANGE UP (ref 4–5.6)
CULTURE RESULTS: NO GROWTH — SIGNIFICANT CHANGE UP
ESTIMATED AVERAGE GLUCOSE: 108 MG/DL — SIGNIFICANT CHANGE UP (ref 68–114)
MRSA PCR RESULT.: DETECTED
S AUREUS DNA NOSE QL NAA+PROBE: DETECTED
SPECIMEN SOURCE: SIGNIFICANT CHANGE UP

## 2021-06-11 RX ORDER — MUPIROCIN 20 MG/G
1 OINTMENT TOPICAL
Qty: 1 | Refills: 0
Start: 2021-06-11 | End: 2021-06-15

## 2021-06-18 PROBLEM — M16.12 UNILATERAL PRIMARY OSTEOARTHRITIS, LEFT HIP: Chronic | Status: ACTIVE | Noted: 2021-06-10

## 2021-06-21 ENCOUNTER — TRANSCRIPTION ENCOUNTER (OUTPATIENT)
Age: 51
End: 2021-06-21

## 2021-06-21 ENCOUNTER — OUTPATIENT (OUTPATIENT)
Dept: OUTPATIENT SERVICES | Facility: HOSPITAL | Age: 51
LOS: 1 days | End: 2021-06-21
Payer: MEDICAID

## 2021-06-21 DIAGNOSIS — Z98.890 OTHER SPECIFIED POSTPROCEDURAL STATES: Chronic | ICD-10-CM

## 2021-06-21 DIAGNOSIS — Z96.641 PRESENCE OF RIGHT ARTIFICIAL HIP JOINT: Chronic | ICD-10-CM

## 2021-06-21 DIAGNOSIS — Z20.828 CONTACT WITH AND (SUSPECTED) EXPOSURE TO OTHER VIRAL COMMUNICABLE DISEASES: ICD-10-CM

## 2021-06-21 DIAGNOSIS — S41.119A LACERATION WITHOUT FOREIGN BODY OF UNSPECIFIED UPPER ARM, INITIAL ENCOUNTER: Chronic | ICD-10-CM

## 2021-06-21 LAB — SARS-COV-2 RNA SPEC QL NAA+PROBE: SIGNIFICANT CHANGE UP

## 2021-06-21 PROCEDURE — U0005: CPT

## 2021-06-21 PROCEDURE — U0003: CPT

## 2021-06-22 ENCOUNTER — TRANSCRIPTION ENCOUNTER (OUTPATIENT)
Age: 51
End: 2021-06-22

## 2021-06-22 ENCOUNTER — INPATIENT (INPATIENT)
Facility: HOSPITAL | Age: 51
LOS: 0 days | Discharge: HOME | DRG: 470 | End: 2021-06-23
Attending: ORTHOPAEDIC SURGERY | Admitting: ORTHOPAEDIC SURGERY
Payer: MEDICAID

## 2021-06-22 ENCOUNTER — RESULT REVIEW (OUTPATIENT)
Age: 51
End: 2021-06-22

## 2021-06-22 VITALS
HEART RATE: 95 BPM | TEMPERATURE: 98 F | WEIGHT: 218.92 LBS | DIASTOLIC BLOOD PRESSURE: 97 MMHG | OXYGEN SATURATION: 97 % | HEIGHT: 73 IN | SYSTOLIC BLOOD PRESSURE: 146 MMHG | RESPIRATION RATE: 14 BRPM

## 2021-06-22 DIAGNOSIS — Z29.9 ENCOUNTER FOR PROPHYLACTIC MEASURES, UNSPECIFIED: ICD-10-CM

## 2021-06-22 DIAGNOSIS — Z98.890 OTHER SPECIFIED POSTPROCEDURAL STATES: Chronic | ICD-10-CM

## 2021-06-22 DIAGNOSIS — Z96.641 PRESENCE OF RIGHT ARTIFICIAL HIP JOINT: Chronic | ICD-10-CM

## 2021-06-22 DIAGNOSIS — F17.200 NICOTINE DEPENDENCE, UNSPECIFIED, UNCOMPLICATED: ICD-10-CM

## 2021-06-22 DIAGNOSIS — M16.12 UNILATERAL PRIMARY OSTEOARTHRITIS, LEFT HIP: ICD-10-CM

## 2021-06-22 DIAGNOSIS — S41.119A LACERATION WITHOUT FOREIGN BODY OF UNSPECIFIED UPPER ARM, INITIAL ENCOUNTER: Chronic | ICD-10-CM

## 2021-06-22 LAB
ANION GAP SERPL CALC-SCNC: 6 MMOL/L — SIGNIFICANT CHANGE UP (ref 5–17)
BUN SERPL-MCNC: 18 MG/DL — SIGNIFICANT CHANGE UP (ref 7–23)
CALCIUM SERPL-MCNC: 7.9 MG/DL — LOW (ref 8.5–10.1)
CHLORIDE SERPL-SCNC: 109 MMOL/L — HIGH (ref 96–108)
CO2 SERPL-SCNC: 25 MMOL/L — SIGNIFICANT CHANGE UP (ref 22–31)
CREAT SERPL-MCNC: 1 MG/DL — SIGNIFICANT CHANGE UP (ref 0.5–1.3)
GLUCOSE SERPL-MCNC: 147 MG/DL — HIGH (ref 70–99)
HCT VFR BLD CALC: 35.3 % — LOW (ref 39–50)
HGB BLD-MCNC: 12.2 G/DL — LOW (ref 13–17)
MCHC RBC-ENTMCNC: 30.4 PG — SIGNIFICANT CHANGE UP (ref 27–34)
MCHC RBC-ENTMCNC: 34.6 GM/DL — SIGNIFICANT CHANGE UP (ref 32–36)
MCV RBC AUTO: 88 FL — SIGNIFICANT CHANGE UP (ref 80–100)
NRBC # BLD: 0 /100 WBCS — SIGNIFICANT CHANGE UP (ref 0–0)
PLATELET # BLD AUTO: 203 K/UL — SIGNIFICANT CHANGE UP (ref 150–400)
POTASSIUM SERPL-MCNC: 3.8 MMOL/L — SIGNIFICANT CHANGE UP (ref 3.5–5.3)
POTASSIUM SERPL-SCNC: 3.8 MMOL/L — SIGNIFICANT CHANGE UP (ref 3.5–5.3)
RBC # BLD: 4.01 M/UL — LOW (ref 4.2–5.8)
RBC # FLD: 13.2 % — SIGNIFICANT CHANGE UP (ref 10.3–14.5)
SODIUM SERPL-SCNC: 140 MMOL/L — SIGNIFICANT CHANGE UP (ref 135–145)
WBC # BLD: 11.6 K/UL — HIGH (ref 3.8–10.5)
WBC # FLD AUTO: 11.6 K/UL — HIGH (ref 3.8–10.5)

## 2021-06-22 PROCEDURE — 88311 DECALCIFY TISSUE: CPT | Mod: 26

## 2021-06-22 PROCEDURE — 88305 TISSUE EXAM BY PATHOLOGIST: CPT | Mod: 26

## 2021-06-22 RX ORDER — ASCORBIC ACID 60 MG
500 TABLET,CHEWABLE ORAL
Refills: 0 | Status: DISCONTINUED | OUTPATIENT
Start: 2021-06-22 | End: 2021-06-23

## 2021-06-22 RX ORDER — TRAMADOL HYDROCHLORIDE 50 MG/1
50 TABLET ORAL EVERY 6 HOURS
Refills: 0 | Status: DISCONTINUED | OUTPATIENT
Start: 2021-06-22 | End: 2021-06-23

## 2021-06-22 RX ORDER — OXYCODONE HYDROCHLORIDE 5 MG/1
10 TABLET ORAL
Refills: 0 | Status: DISCONTINUED | OUTPATIENT
Start: 2021-06-22 | End: 2021-06-23

## 2021-06-22 RX ORDER — SENNA PLUS 8.6 MG/1
2 TABLET ORAL AT BEDTIME
Refills: 0 | Status: DISCONTINUED | OUTPATIENT
Start: 2021-06-22 | End: 2021-06-23

## 2021-06-22 RX ORDER — OXYCODONE HYDROCHLORIDE 5 MG/1
5 TABLET ORAL
Refills: 0 | Status: DISCONTINUED | OUTPATIENT
Start: 2021-06-22 | End: 2021-06-23

## 2021-06-22 RX ORDER — ASPIRIN/CALCIUM CARB/MAGNESIUM 324 MG
1 TABLET ORAL
Qty: 60 | Refills: 0
Start: 2021-06-22 | End: 2021-07-21

## 2021-06-22 RX ORDER — IBUPROFEN 200 MG
1 TABLET ORAL
Qty: 0 | Refills: 0 | DISCHARGE

## 2021-06-22 RX ORDER — POLYETHYLENE GLYCOL 3350 17 G/17G
17 POWDER, FOR SOLUTION ORAL AT BEDTIME
Refills: 0 | Status: DISCONTINUED | OUTPATIENT
Start: 2021-06-22 | End: 2021-06-23

## 2021-06-22 RX ORDER — BUPIVACAINE 13.3 MG/ML
20 INJECTION, SUSPENSION, LIPOSOMAL INFILTRATION ONCE
Refills: 0 | Status: DISCONTINUED | OUTPATIENT
Start: 2021-06-22 | End: 2021-06-22

## 2021-06-22 RX ORDER — OXYCODONE HYDROCHLORIDE 5 MG/1
5 TABLET ORAL ONCE
Refills: 0 | Status: DISCONTINUED | OUTPATIENT
Start: 2021-06-22 | End: 2021-06-22

## 2021-06-22 RX ORDER — OXYCODONE HYDROCHLORIDE 5 MG/1
1 TABLET ORAL
Qty: 42 | Refills: 0
Start: 2021-06-22 | End: 2021-06-26

## 2021-06-22 RX ORDER — VANCOMYCIN HCL 1 G
1500 VIAL (EA) INTRAVENOUS ONCE
Refills: 0 | Status: COMPLETED | OUTPATIENT
Start: 2021-06-22 | End: 2021-06-22

## 2021-06-22 RX ORDER — BENZOCAINE AND MENTHOL 5; 1 G/100ML; G/100ML
1 LIQUID ORAL THREE TIMES A DAY
Refills: 0 | Status: DISCONTINUED | OUTPATIENT
Start: 2021-06-22 | End: 2021-06-23

## 2021-06-22 RX ORDER — MAGNESIUM HYDROXIDE 400 MG/1
30 TABLET, CHEWABLE ORAL DAILY
Refills: 0 | Status: DISCONTINUED | OUTPATIENT
Start: 2021-06-22 | End: 2021-06-23

## 2021-06-22 RX ORDER — LANOLIN ALCOHOL/MO/W.PET/CERES
3 CREAM (GRAM) TOPICAL AT BEDTIME
Refills: 0 | Status: DISCONTINUED | OUTPATIENT
Start: 2021-06-22 | End: 2021-06-23

## 2021-06-22 RX ORDER — ACETAMINOPHEN 500 MG
650 TABLET ORAL EVERY 6 HOURS
Refills: 0 | Status: DISCONTINUED | OUTPATIENT
Start: 2021-06-22 | End: 2021-06-23

## 2021-06-22 RX ORDER — FOLIC ACID 0.8 MG
1 TABLET ORAL DAILY
Refills: 0 | Status: DISCONTINUED | OUTPATIENT
Start: 2021-06-22 | End: 2021-06-23

## 2021-06-22 RX ORDER — PANTOPRAZOLE SODIUM 20 MG/1
40 TABLET, DELAYED RELEASE ORAL
Refills: 0 | Status: DISCONTINUED | OUTPATIENT
Start: 2021-06-22 | End: 2021-06-23

## 2021-06-22 RX ORDER — ONDANSETRON 8 MG/1
4 TABLET, FILM COATED ORAL EVERY 6 HOURS
Refills: 0 | Status: DISCONTINUED | OUTPATIENT
Start: 2021-06-22 | End: 2021-06-23

## 2021-06-22 RX ORDER — SODIUM CHLORIDE 9 MG/ML
1000 INJECTION, SOLUTION INTRAVENOUS
Refills: 0 | Status: DISCONTINUED | OUTPATIENT
Start: 2021-06-22 | End: 2021-06-22

## 2021-06-22 RX ORDER — ONDANSETRON 8 MG/1
4 TABLET, FILM COATED ORAL ONCE
Refills: 0 | Status: DISCONTINUED | OUTPATIENT
Start: 2021-06-22 | End: 2021-06-22

## 2021-06-22 RX ORDER — SODIUM CHLORIDE 9 MG/ML
1000 INJECTION, SOLUTION INTRAVENOUS
Refills: 0 | Status: DISCONTINUED | OUTPATIENT
Start: 2021-06-22 | End: 2021-06-23

## 2021-06-22 RX ORDER — ASPIRIN/CALCIUM CARB/MAGNESIUM 324 MG
325 TABLET ORAL
Refills: 0 | Status: DISCONTINUED | OUTPATIENT
Start: 2021-06-23 | End: 2021-06-23

## 2021-06-22 RX ORDER — HYDROMORPHONE HYDROCHLORIDE 2 MG/ML
0.5 INJECTION INTRAMUSCULAR; INTRAVENOUS; SUBCUTANEOUS
Refills: 0 | Status: DISCONTINUED | OUTPATIENT
Start: 2021-06-22 | End: 2021-06-22

## 2021-06-22 RX ADMIN — HYDROMORPHONE HYDROCHLORIDE 0.5 MILLIGRAM(S): 2 INJECTION INTRAMUSCULAR; INTRAVENOUS; SUBCUTANEOUS at 11:37

## 2021-06-22 RX ADMIN — SODIUM CHLORIDE 75 MILLILITER(S): 9 INJECTION, SOLUTION INTRAVENOUS at 11:25

## 2021-06-22 RX ADMIN — Medication 3 MILLIGRAM(S): at 21:02

## 2021-06-22 RX ADMIN — OXYCODONE HYDROCHLORIDE 10 MILLIGRAM(S): 5 TABLET ORAL at 21:21

## 2021-06-22 RX ADMIN — OXYCODONE HYDROCHLORIDE 10 MILLIGRAM(S): 5 TABLET ORAL at 18:42

## 2021-06-22 RX ADMIN — HYDROMORPHONE HYDROCHLORIDE 0.5 MILLIGRAM(S): 2 INJECTION INTRAMUSCULAR; INTRAVENOUS; SUBCUTANEOUS at 12:27

## 2021-06-22 RX ADMIN — Medication 650 MILLIGRAM(S): at 23:40

## 2021-06-22 RX ADMIN — Medication 650 MILLIGRAM(S): at 18:42

## 2021-06-22 RX ADMIN — BENZOCAINE AND MENTHOL 1 LOZENGE: 5; 1 LIQUID ORAL at 17:45

## 2021-06-22 RX ADMIN — SENNA PLUS 2 TABLET(S): 8.6 TABLET ORAL at 21:02

## 2021-06-22 RX ADMIN — Medication 1 TABLET(S): at 21:02

## 2021-06-22 RX ADMIN — Medication 300 MILLIGRAM(S): at 07:34

## 2021-06-22 RX ADMIN — OXYCODONE HYDROCHLORIDE 10 MILLIGRAM(S): 5 TABLET ORAL at 20:51

## 2021-06-22 RX ADMIN — HYDROMORPHONE HYDROCHLORIDE 0.5 MILLIGRAM(S): 2 INJECTION INTRAMUSCULAR; INTRAVENOUS; SUBCUTANEOUS at 12:12

## 2021-06-22 RX ADMIN — POLYETHYLENE GLYCOL 3350 17 GRAM(S): 17 POWDER, FOR SOLUTION ORAL at 21:02

## 2021-06-22 RX ADMIN — Medication 300 MILLIGRAM(S): at 20:51

## 2021-06-22 RX ADMIN — Medication 650 MILLIGRAM(S): at 23:10

## 2021-06-22 RX ADMIN — Medication 1 TABLET(S): at 17:42

## 2021-06-22 RX ADMIN — BENZOCAINE AND MENTHOL 1 LOZENGE: 5; 1 LIQUID ORAL at 21:02

## 2021-06-22 RX ADMIN — OXYCODONE HYDROCHLORIDE 10 MILLIGRAM(S): 5 TABLET ORAL at 17:42

## 2021-06-22 RX ADMIN — Medication 500 MILLIGRAM(S): at 17:42

## 2021-06-22 RX ADMIN — BENZOCAINE AND MENTHOL 1 LOZENGE: 5; 1 LIQUID ORAL at 21:01

## 2021-06-22 RX ADMIN — HYDROMORPHONE HYDROCHLORIDE 0.5 MILLIGRAM(S): 2 INJECTION INTRAMUSCULAR; INTRAVENOUS; SUBCUTANEOUS at 11:53

## 2021-06-22 RX ADMIN — HYDROMORPHONE HYDROCHLORIDE 0.5 MILLIGRAM(S): 2 INJECTION INTRAMUSCULAR; INTRAVENOUS; SUBCUTANEOUS at 12:42

## 2021-06-22 RX ADMIN — Medication 650 MILLIGRAM(S): at 17:42

## 2021-06-22 NOTE — OCCUPATIONAL THERAPY INITIAL EVALUATION ADULT - LEVEL OF INDEPENDENCE: SUPINE/SIT, REHAB EVAL
contact guard Yes Modify Regimen: Continue ketoconazole 2 % topical cream -PRN\\nContinue hydrocortisone 2.5 % topical cream BID -PRN Detail Level: Zone

## 2021-06-22 NOTE — DISCHARGE NOTE PROVIDER - NSDCMRMEDTOKEN_GEN_ALL_CORE_FT
mg oral tablet: 1 tab(s) orally every 6 hours, As Needed  mupirocin 2% topical ointment: Apply topically to affected area 2 times a day    Adult Aspirin 325 mg oral tablet: 1 tab(s) orally 2 times a day MDD:2 Tablets  oxyCODONE 5 mg oral tablet: 1 tab(s) orally every 4 hours, As Needed MDD:6 tablets

## 2021-06-22 NOTE — DISCHARGE NOTE PROVIDER - NSDCFUADDINST_GEN_ALL_CORE_FT
Discharge Instructions Total Hip Arthroplasty    1. Diet: Resume previous diet  2. Activity: WBAT. Rolling walker. Daily Physical Therapy.  3. Call with: fever over 101, wound redness, drainage or open area, calf pain/calf swelling.  4. Wound Care: Remove old and Place new Aquacel bandage to hip wound every 7days.   5. RN can Remove staples Post Op Day #14  so long as wound is healed, no drainage or open area.   6. OK to Shower with Aquacel. Must be an Aquacel. Avoid direct water beating on bandage.   7. DVT PE Prophylaxis: ASA 325BID. See Med Rec.  8.  Follow Up: Dr. Marin 2 weeks in office. Call to schedule.   9. Pain Medication: eRX sent to your pharmacy for  if you go home.

## 2021-06-22 NOTE — CONSULT NOTE ADULT - SUBJECTIVE AND OBJECTIVE BOX
51 yo male with no pmh, presents to hospital scheduled for a left anterior total hip replacement on 6/22 with Dr. Marin. C/O left hip pain from OA. Rates his pain today 8/10. IBU as needed. S/P right hip replacement in 2019, with no post-op issues. 51 yo male with no pmh, presents to PST scheduled for a left anterior total hip replacement on 6/22 with Dr. Marin. C/O left hip pain from OA. Rates his pain today 8/10. IBU as needed. S/P right hip replacement in 2019, with no post-op issues.     Allergies:  	No Known Allergies:    Home Medications:   * Incomplete Medication History as of 10-Cortes-2021 14:39 documented in Structured Notes  · 	 mg oral tablet: Last Dose Taken:  , 1 tab(s) orally every 6 hours, As Needed    Past Medical, Past Surgical, and Family History:  PAST MEDICAL HISTORY:  Unilateral primary osteoarthritis, left hip     Unilateral primary osteoarthritis, right hip.     PAST SURGICAL HISTORY:  Arm laceration (Repair of right forearm laceration 1995- tendon repair)    History of colonoscopy     S/P shoulder surgery (Left, 2002)    Status post hip replacement, right 2019.     FAMILY HISTORY:  No pertinent family history in first degree relatives.    Social History:  · Marital Status	Single  · Occupation	Demolition contractor  · Lives With	alone    Substance Use History:  · Substance Use	Denies caffeine and drug use    Alcohol Use History:  · Have you ever consumed alcohol	never    Tobacco Usage:  · Tobacco Usage: Current every day smoker  · Tobacco Type: cigarettes      · Number of Packs per Day: 1  · Number of yrs: 30  · Pack yrs: 30  · Readiness to Quit Tobacco Use: not motivated to quit  · Attempts to Quit Tobacco Use: none  · Tobacco Cessation Education (provide if tobacco used w/i the last 12 mos): yes  pamphlet given  Review of Systems:   · Negative General Symptoms	no fever; no chills; no malaise; no fatigue  · General Comments	Denies recent international travel, recent illness and sick contact with COVID in the last 3 weeks  · Skin/Breast	negative  · Ophthalmologic	negative  · ENMT	negative  · Negative Respiratory and Thorax Symptoms	no wheezing; no dyspnea; no cough  · Respiratory and Thorax Comments	smoker  · Negative Cardiovascular Symptoms	no chest pain; no palpitations; no dyspnea on exertion  · Gastrointestinal	negative  · Genitourinary	negative  · Musculoskeletal Comments	See HPI  · Neurological	negative  · Psychiatric	negative  · Hematology/Lymphatics	negative  · Endocrine	negative  · Allergic/Immunologic	negative    Physical Exam:  · Constitutional	detailed exam  · Constitutional Details	well-groomed; no distress  · Eyes	detailed exam  · Eyes Details	conjunctiva clear  · ENMT	No oral lesions; no gross abnormalities  · Neck	detailed exam  · Neck Details	normal; supple; no JVD  · Breasts	not examined  · Back	No deformity or limitation of movement  · Respiratory	Breath Sounds equal & clear to percussion & auscultation, no accessory muscle use  · Cardiovascular	Regular rate & rhythm, normal S1, S2; no murmurs, gallops or rubs; no S3, S4  · Gastrointestinal	not examined  · Genitourinary	not examined  · Rectal	not examined  · Extremities	No cyanosis, clubbing or edema  · Vascular	detailed exam  · Radial Pulse	right normal; left normal  · Neurological	detailed exam  · Neurological Details	alert and oriented x 3  · Gait/Balance	antalgic gait  · Skin	No lesions; no rash  · Lymph Nodes	not examined  · Musculoskeletal	detailed exam  · Musculoskeletal Details	decreased ROM; decreased ROM due to pain  · Musculoskeletal Comments	left hip  · Psychiatric	Affect and characteristics of appearance, verbalizations, behaviors are appropriate

## 2021-06-22 NOTE — CHART NOTE - NSCHARTNOTEFT_GEN_A_CORE
Do you have Advance Directives (HCP / LV / Organ donation / Documentation of oral advance Directive):   (  x  )  yes    (      )    NO                                                                            Do you have LV - Living will :                                                                                                                                             (  x  )  yes    (      )   No    Do you have HCP - Health Care Proxy:                                                                                                                            (   x  )  yes   (       ) N0    Do you have DNR- Do Not Resuscitate :                                                                                                                           (      )  yes  (   x     )  No    Do you have DNI- Do Not intubate  :                                                                                                                               (      )  yes   (   x    ) No    Do you have MOLST - Medical orders for Life sustaining treatment  :                                                                    (      ) yes    (  x     ) No    Decision Maker :  ( x    ) Patient     (      )  HCA   (     ) Public Health Law Surrogate     (      ) Surrogate  (       ) Guardian    Goals of Care :  (   x   )   Complete Care     (       ) No Limitations                              (       )   Comfort Care       (       )  Hospice                               (      )   Limited medical Intervention / s    Medical Interventions :   (   x     )   CPR       (        )  DNR                                               (     x   )  Intubation with MV - Mechanical Ventilation  (  x    ) BIPAP/CPAP    (         )   DNI                                               (      x   )  Artificial Nutrition -  IVF, TPN / PPN, Tube Feeds             (         )   No Feeding Tube                                                (   x     ) Use Antibiotics                         (          ) No Antibiotics                                                (    x     ) Blood and Blood Products     (         )   No Blood or Blood products                                                (   x       )  Dialysis                                    (         )  No Dialysis                                                (          )  Medical Management only  (         )  No Invasive Interventions or Surgery  Time spent :                        (    x   ) upto 30 minutes                       (           )   more than 30 minutes  ACP reviewed and discussed

## 2021-06-22 NOTE — OCCUPATIONAL THERAPY INITIAL EVALUATION ADULT - ADDITIONAL COMMENTS
Patient lives in a house with 3 steps with 1 handrail and no steps inside to access bedroom and bathroom. Patient has a bathtub with curtain. Patient does not own any DME. Patient drives. Patient performed functional ambulation in hospital room and in hallway using rolling walker with contact guard assistance.

## 2021-06-22 NOTE — PROGRESS NOTE ADULT - SUBJECTIVE AND OBJECTIVE BOX
Ortho Post Op Check    Pt comfortable without complaints, pain controlled  Denies CP, SOB, N/V, numbness/tingling     Vital Signs Last 24 Hrs  T(C): 36.5 (06-22-21 @ 11:24), Max: 36.8 (06-22-21 @ 07:01)  T(F): 97.7 (06-22-21 @ 11:24), Max: 98.2 (06-22-21 @ 07:01)  HR: 77 (06-22-21 @ 11:45) (76 - 95)  BP: 138/87 (06-22-21 @ 11:40) (135/89 - 146/97)  BP(mean): --  RR: 18 (06-22-21 @ 11:40) (14 - 18)  SpO2: 100% (06-22-21 @ 11:40) (97% - 100%)    PE:  General: Pt Alert and oriented, NAD  LLE: DSG C/D/I  Pulses: Foot WWP; DP pulse 2+; Cap refill < 2 sec  Sensation: SILT and symmetric to contralateral extremity  Motor: EHL/FHL/TA/GS 5/5 and symmetric to contralateral extremity            A/P: 50yMale s/p left anterior FRANSISCO POD 0  - Stable  - Pain Control  - DVT ppx: ASA 325mg   - Post op abx: vanc  - WBAT  - PT: pending PT eval  - dispo planning

## 2021-06-22 NOTE — BRIEF OPERATIVE NOTE - CONDITION POST OP
Stable
benign HTN with anxiety. instructed to check BP and HR in am, afternoon and evening- write on paper and present to dr george, cardio. continue with meds.  return if symptoms worsens. dietary changes.

## 2021-06-22 NOTE — DISCHARGE NOTE PROVIDER - CARE PROVIDER_API CALL
Bahman Marin  ORTHOPAEDIC SURGERY  [address] [place] [address]  Phone: [phone]  Fax: [phone]  Follow Up Time:

## 2021-06-22 NOTE — DISCHARGE NOTE PROVIDER - HOSPITAL COURSE
Hospital Course:  The patient is a 50y Male status post elective Total hip Arthroplasty to the left HIP after failing outpatient nonoperative conservative management. Patient presented to [facility] after being medically cleared for an elective surgical procedure. The patient was taken to the operating room on date mentioned above. Prophylactic antibiotics were started before the procedure and continued for 24 hours. There were no complications during the procedure and patient tolerated the procedure well. The patient was transferred to the recovery room in stable condition and subsequently to the surgical floor. The patient was placed on ASAS 325BID for anticoagulation while in house to continue until 30 days. All home medications were continued. The patient received physical therapy daily and daily labs were followed. The dressing was kept clean, dry, intact. The rest of the hospital stay was unremarkable.

## 2021-06-22 NOTE — OCCUPATIONAL THERAPY INITIAL EVALUATION ADULT - PERTINENT HX OF CURRENT PROBLEM, REHAB EVAL
51 y/o male s/p Left FRANSISCO anterior approach by Dr. Marin 6/22/2021 due to OA. Pt s/p Right THR 3/2/2019.

## 2021-06-23 ENCOUNTER — TRANSCRIPTION ENCOUNTER (OUTPATIENT)
Age: 51
End: 2021-06-23

## 2021-06-23 VITALS
DIASTOLIC BLOOD PRESSURE: 79 MMHG | HEART RATE: 93 BPM | OXYGEN SATURATION: 92 % | SYSTOLIC BLOOD PRESSURE: 124 MMHG | RESPIRATION RATE: 17 BRPM | WEIGHT: 221.34 LBS | TEMPERATURE: 99 F

## 2021-06-23 LAB
ANION GAP SERPL CALC-SCNC: 7 MMOL/L — SIGNIFICANT CHANGE UP (ref 5–17)
BUN SERPL-MCNC: 18 MG/DL — SIGNIFICANT CHANGE UP (ref 7–23)
CALCIUM SERPL-MCNC: 8 MG/DL — LOW (ref 8.5–10.1)
CHLORIDE SERPL-SCNC: 108 MMOL/L — SIGNIFICANT CHANGE UP (ref 96–108)
CO2 SERPL-SCNC: 27 MMOL/L — SIGNIFICANT CHANGE UP (ref 22–31)
COVID-19 SPIKE DOMAIN AB INTERP: NEGATIVE — SIGNIFICANT CHANGE UP
COVID-19 SPIKE DOMAIN ANTIBODY RESULT: 0.4 U/ML — SIGNIFICANT CHANGE UP
CREAT SERPL-MCNC: 0.92 MG/DL — SIGNIFICANT CHANGE UP (ref 0.5–1.3)
GLUCOSE SERPL-MCNC: 142 MG/DL — HIGH (ref 70–99)
HCT VFR BLD CALC: 34.5 % — LOW (ref 39–50)
HGB BLD-MCNC: 11.9 G/DL — LOW (ref 13–17)
MCHC RBC-ENTMCNC: 30.1 PG — SIGNIFICANT CHANGE UP (ref 27–34)
MCHC RBC-ENTMCNC: 34.5 GM/DL — SIGNIFICANT CHANGE UP (ref 32–36)
MCV RBC AUTO: 87.1 FL — SIGNIFICANT CHANGE UP (ref 80–100)
NRBC # BLD: 0 /100 WBCS — SIGNIFICANT CHANGE UP (ref 0–0)
PLATELET # BLD AUTO: 197 K/UL — SIGNIFICANT CHANGE UP (ref 150–400)
POTASSIUM SERPL-MCNC: 3.5 MMOL/L — SIGNIFICANT CHANGE UP (ref 3.5–5.3)
POTASSIUM SERPL-SCNC: 3.5 MMOL/L — SIGNIFICANT CHANGE UP (ref 3.5–5.3)
RBC # BLD: 3.96 M/UL — LOW (ref 4.2–5.8)
RBC # FLD: 13.2 % — SIGNIFICANT CHANGE UP (ref 10.3–14.5)
SARS-COV-2 IGG+IGM SERPL QL IA: 0.4 U/ML — SIGNIFICANT CHANGE UP
SARS-COV-2 IGG+IGM SERPL QL IA: NEGATIVE — SIGNIFICANT CHANGE UP
SODIUM SERPL-SCNC: 142 MMOL/L — SIGNIFICANT CHANGE UP (ref 135–145)
WBC # BLD: 8.81 K/UL — SIGNIFICANT CHANGE UP (ref 3.8–10.5)
WBC # FLD AUTO: 8.81 K/UL — SIGNIFICANT CHANGE UP (ref 3.8–10.5)

## 2021-06-23 PROCEDURE — 82962 GLUCOSE BLOOD TEST: CPT

## 2021-06-23 PROCEDURE — 97110 THERAPEUTIC EXERCISES: CPT

## 2021-06-23 PROCEDURE — 88311 DECALCIFY TISSUE: CPT

## 2021-06-23 PROCEDURE — 97112 NEUROMUSCULAR REEDUCATION: CPT

## 2021-06-23 PROCEDURE — 88305 TISSUE EXAM BY PATHOLOGIST: CPT

## 2021-06-23 PROCEDURE — 97162 PT EVAL MOD COMPLEX 30 MIN: CPT

## 2021-06-23 PROCEDURE — 97165 OT EVAL LOW COMPLEX 30 MIN: CPT

## 2021-06-23 PROCEDURE — 36415 COLL VENOUS BLD VENIPUNCTURE: CPT

## 2021-06-23 PROCEDURE — 80048 BASIC METABOLIC PNL TOTAL CA: CPT

## 2021-06-23 PROCEDURE — 97116 GAIT TRAINING THERAPY: CPT

## 2021-06-23 PROCEDURE — 97535 SELF CARE MNGMENT TRAINING: CPT

## 2021-06-23 PROCEDURE — 85027 COMPLETE CBC AUTOMATED: CPT

## 2021-06-23 PROCEDURE — 86769 SARS-COV-2 COVID-19 ANTIBODY: CPT

## 2021-06-23 PROCEDURE — 76000 FLUOROSCOPY <1 HR PHYS/QHP: CPT

## 2021-06-23 PROCEDURE — C1776: CPT

## 2021-06-23 RX ADMIN — OXYCODONE HYDROCHLORIDE 10 MILLIGRAM(S): 5 TABLET ORAL at 09:33

## 2021-06-23 RX ADMIN — OXYCODONE HYDROCHLORIDE 10 MILLIGRAM(S): 5 TABLET ORAL at 01:13

## 2021-06-23 RX ADMIN — Medication 1 TABLET(S): at 05:31

## 2021-06-23 RX ADMIN — Medication 325 MILLIGRAM(S): at 05:31

## 2021-06-23 RX ADMIN — OXYCODONE HYDROCHLORIDE 10 MILLIGRAM(S): 5 TABLET ORAL at 05:04

## 2021-06-23 RX ADMIN — PANTOPRAZOLE SODIUM 40 MILLIGRAM(S): 20 TABLET, DELAYED RELEASE ORAL at 05:31

## 2021-06-23 RX ADMIN — OXYCODONE HYDROCHLORIDE 10 MILLIGRAM(S): 5 TABLET ORAL at 00:43

## 2021-06-23 RX ADMIN — BENZOCAINE AND MENTHOL 1 LOZENGE: 5; 1 LIQUID ORAL at 09:34

## 2021-06-23 RX ADMIN — OXYCODONE HYDROCHLORIDE 10 MILLIGRAM(S): 5 TABLET ORAL at 04:34

## 2021-06-23 RX ADMIN — Medication 650 MILLIGRAM(S): at 05:31

## 2021-06-23 RX ADMIN — OXYCODONE HYDROCHLORIDE 10 MILLIGRAM(S): 5 TABLET ORAL at 10:33

## 2021-06-23 RX ADMIN — Medication 650 MILLIGRAM(S): at 06:01

## 2021-06-23 RX ADMIN — Medication 500 MILLIGRAM(S): at 05:31

## 2021-06-23 NOTE — DISCHARGE NOTE NURSING/CASE MANAGEMENT/SOCIAL WORK - PATIENT PORTAL LINK FT
You can access the FollowMyHealth Patient Portal offered by [facility] by registering at the following website: [email or web address]. By joining [employer]s FollowMyHealth portal, you will also be able to view your health information using other applications (apps) compatible with our system.

## 2021-06-23 NOTE — PROGRESS NOTE ADULT - SUBJECTIVE AND OBJECTIVE BOX
POD 1 L FRANSISCO    Pt comfortable without complaints, pain controlled, worked well with PT yesterday - ready for home today  Denies CP, SOB, N/V, numbness/tingling     Orthostatic VS      PE:  General: Pt Alert and oriented, NAD  LLE: DSG C/D/I  Pulses: Foot WWP; DP pulse 2+; Cap refill < 2 sec  Sensation: SILT and symmetric to contralateral extremity  Motor: EHL/FHL/TA/GS 5/5 and symmetric to contralateral extremity            A/P: 50yMale s/p left anterior FRANSISCO POD 1  - Stable  - Pain Control  - DVT ppx: ASA 325mg   - Post op abx: vanc  - WBAT  - dispo planning HOME today

## 2021-07-05 NOTE — PROGRESS NOTE ADULT - PROBLEM/PLAN-1
DISPLAY PLAN FREE TEXT
Walk in

## 2021-07-27 NOTE — DISCHARGE NOTE NURSING/CASE MANAGEMENT/SOCIAL WORK - CAREGIVER NAME
Quin Cephalexin Pregnancy And Lactation Text: This medication is Pregnancy Category B and considered safe during pregnancy.  It is also excreted in breast milk but can be used safely for shorter doses.

## 2021-09-22 NOTE — CONSULT NOTE ADULT - PROVIDER SPECIALTY LIST ADULT
Family Medicine Finasteride Counseling:  I discussed with the patient the risks of use of finasteride including but not limited to decreased libido, decreased ejaculate volume, gynecomastia, and depression. Women should not handle medication.  All of the patient's questions and concerns were addressed. Finasteride Male Counseling: Finasteride Counseling:  I discussed with the patient the risks of use of finasteride including but not limited to decreased libido, decreased ejaculate volume, gynecomastia, and depression. Women should not handle medication.  All of the patient's questions and concerns were addressed.

## 2023-06-19 NOTE — PATIENT PROFILE ADULT - NSPROPTRIGHTREPPHONE_GEN_A_NUR
[phone] Consent (Near Eyelid Margin)/Introductory Paragraph: The rationale for Mohs was explained to the patient and consent was obtained. The risks, benefits and alternatives to therapy were discussed in detail. Specifically, the risks of ectropion or eyelid deformity, infection, scarring, bleeding, prolonged wound healing, incomplete removal, allergy to anesthesia, nerve injury and recurrence were addressed. Prior to the procedure, the treatment site was clearly identified and confirmed by the patient.

## 2023-09-22 NOTE — H&P PST ADULT - NSANTHTOTALSCORECAL_ENT_A_CORE
PAST MEDICAL HISTORY:  HLD (hyperlipidemia)     HTN (hypertension)     OA (osteoarthritis)     Obesity     Type 2 diabetes mellitus     
1

## 2025-02-21 NOTE — PHYSICAL THERAPY INITIAL EVALUATION ADULT - TRANSFER TRAINING, PT EVAL
